# Patient Record
Sex: FEMALE | Race: WHITE
[De-identification: names, ages, dates, MRNs, and addresses within clinical notes are randomized per-mention and may not be internally consistent; named-entity substitution may affect disease eponyms.]

---

## 2017-02-07 ENCOUNTER — HOSPITAL ENCOUNTER (EMERGENCY)
Dept: HOSPITAL 25 - UCCORT | Age: 52
Discharge: HOME | End: 2017-02-07
Payer: COMMERCIAL

## 2017-02-07 VITALS — SYSTOLIC BLOOD PRESSURE: 103 MMHG | DIASTOLIC BLOOD PRESSURE: 71 MMHG

## 2017-02-07 DIAGNOSIS — H10.9: Primary | ICD-10-CM

## 2017-02-07 DIAGNOSIS — Z88.5: ICD-10-CM

## 2017-02-07 DIAGNOSIS — J06.9: ICD-10-CM

## 2017-02-07 DIAGNOSIS — M32.9: ICD-10-CM

## 2017-02-07 DIAGNOSIS — F17.210: ICD-10-CM

## 2017-02-07 DIAGNOSIS — Z88.1: ICD-10-CM

## 2017-02-07 PROCEDURE — 99212 OFFICE O/P EST SF 10 MIN: CPT

## 2017-02-07 PROCEDURE — G0463 HOSPITAL OUTPT CLINIC VISIT: HCPCS

## 2017-02-07 NOTE — UC
Throat Pain/Nasal Genaro HPI





- HPI Summary


HPI Summary: 





complaint of right eye redness and itching which started last night - some 

purulent discharge and crusting on eyelashes this morning


 used normal saline drops without relief


nasal congestion that returned 3 days ago


mild sore throat


post nasal drip


non productive cough


 dx with sinusitis and finished a course of augmentin approx 6 days ago


still taking prednisone for lupus flareup


taking tylenol, affrin nasal spray OTC cold and sinus





- History of Current Complaint


Chief Complaint: UCGeneralIllness


Stated Complaint: SINUS/PINK EYE


Time Seen by Provider: 02/07/17 17:01


Hx Obtained From: Patient





- Allergies/Home Medications


Allergies/Adverse Reactions: 


 Allergies











Allergy/AdvReac Type Severity Reaction Status Date / Time


 


Ciprofloxacin [From Cipro] Allergy Intermediate itchy rash Verified 02/07/17 16:

42


 


Levofloxacin [From Levaquin] Allergy Intermediate itchy rash Verified 02/07/17 

16:42


 


Morphine and Related Allergy Intermediate itchy rash Verified 02/07/17 16:42











Home Medications: 


 Home Medications





Folic Acid TAB* [Folvite TAB*] 1 mg PO DAILY 02/07/17 [History Confirmed 02/07/ 17]


Hydroxychloroquine TAB* [Plaquenil TAB*] 200 mg PO BID 02/07/17 [History 

Confirmed 02/07/17]


Methotrexate* 12.5 mg .SEE ORDER WEEKLY 02/07/17 [History Confirmed 02/07/17]











PMH/Surg Hx/FS Hx/Imm Hx


Previously Healthy: No - lupus flareup


Endocrine History Of: Reports: Thyroid Disease


Cardiovascular History Of: Reports: Cardiac Disorders - SVT





- Surgical History


Surgical History: Yes


Surgery Procedure, Year, and Place: HYSTERECTOMY, GALL BLADDER REMOVAL, 

TONSILLECTOMY





- Family History


Known Family History: 


   Negative: Cardiac Disease, Hypertension, Diabetes





- Social History


Occupation: Employed Full-time


Lives: With Family


Alcohol Use: Occasionally


Substance Use Type: None


Smoking Status (MU): Current Some Day Smoker


Type: Cigarettes


Length of Time of Smoking/Using Tobacco: 30 YRS


Cessation Counseling: Patient Advised to Stop





Review of Systems


Constitutional: Negative


ENT: Sore Throat, Nasal Discharge


Respiratory: Cough


Cardiovascular: Negative


Gastrointestinal: Negative


Genitourinary: Negative


Motor: Negative


Neurovascular: Negative


Musculoskeletal: Negative


Neurological: Negative


Psychological: Negative


All Other Systems Reviewed And Are Negative: Yes





Physical Exam


Triage Information Reviewed: Yes


Appearance: No Pain Distress, Well-Nourished


Vital Signs: 


 Initial Vital Signs











Temp  98.2 F   02/07/17 16:37


 


Pulse  85   02/07/17 16:37


 


Resp  16   02/07/17 16:37


 


BP  103/71   02/07/17 16:37


 


Pulse Ox  98   02/07/17 16:37











Vital Signs Reviewed: Yes


Eyes: Positive: Conjunctiva Inflamed - right eye, Discharge


ENT: Positive: Pharynx normal, Nasal congestion, Nasal drainage - clear, Other: 

- no sinus tednerness.  Negative: TMs normal, Tonsillar swelling, Tonsillar 

exudate


Neck: Positive: No Lymphadenopathy


Respiratory: Positive: Lungs clear, Normal breath sounds, No respiratory 

distress


Cardiovascular: Positive: RRR, No Murmur, Pulses Normal


Abdomen Description: Positive: Nontender, Soft


Bowel Sounds: Positive: Present


Musculoskeletal: Positive: No Edema


Neurological: Positive: Alert


Psychological Exam: Normal


Skin Exam: Normal





Throat Pain/Nasal Course/Dx





- Differential Dx/Diagnosis


Differential Diagnosis/HQI/PQRI: Pharyngitis, Sinusitis, URI


Provider Diagnoses: conjuncivitis, URI





Discharge





- Discharge Plan


Condition: Stable


Disposition: HOME


Prescriptions: 


Tobramycin (Ophth) [Tobrex] 0.3 % OP Q4HR #1 btl


Patient Education Materials:  Conjunctivitis (ED), Upper Respiratory Infection (

ED)


Referrals: 


Arielle Og MD [Primary Care Provider] - 


Additional Instructions: 


Start antibiotic drops as directed


Increase fluids and rest


Take acetaminophen or ibuprofen for fever or pain





Please review your discharge instructions. 


 If your symptoms do not improve please call your primary care provider or 

return to urgent care

## 2020-02-23 ENCOUNTER — HOSPITAL ENCOUNTER (EMERGENCY)
Dept: HOSPITAL 25 - UCCORT | Age: 55
Discharge: HOME | End: 2020-02-23
Payer: COMMERCIAL

## 2020-02-23 VITALS — DIASTOLIC BLOOD PRESSURE: 42 MMHG | SYSTOLIC BLOOD PRESSURE: 108 MMHG

## 2020-02-23 DIAGNOSIS — Z88.1: ICD-10-CM

## 2020-02-23 DIAGNOSIS — E07.9: ICD-10-CM

## 2020-02-23 DIAGNOSIS — F17.210: ICD-10-CM

## 2020-02-23 DIAGNOSIS — B02.9: Primary | ICD-10-CM

## 2020-02-23 DIAGNOSIS — Z79.890: ICD-10-CM

## 2020-02-23 DIAGNOSIS — Z88.5: ICD-10-CM

## 2020-02-23 PROCEDURE — G0463 HOSPITAL OUTPT CLINIC VISIT: HCPCS

## 2020-02-23 PROCEDURE — 99202 OFFICE O/P NEW SF 15 MIN: CPT

## 2020-02-23 NOTE — XMS REPORT
Summary of Care

 Created on:2020



Patient:Marina Bustos

Sex:Female

:1965

External Reference #:[id number]





Demographics







 Address  [address] [place] [address]

 

 Mobile Phone  [phone]

 

 Home Phone  [phone]

 

 Email Address  [email or web address]

 

 Preferred Language  English

 

 Marital Status  Not  or 

 

 Baptism Affiliation  Unknown

 

 Race  White

 

 Ethnic Group  Not  or 









Author







 Organization  [facility]

 

 Address  [address] [place] [address]









Support







 Name  Relationship  Address  Phone

 

 Larry Bustos  Spouse  [address]  [phone]



     [place] [address]  

 

 Javier Bryson  Son  Unavailable  [phone]









Care Team Providers







 Name  Role  Phone

 

 Denisha Alexsandra A NP  Primary Care Provider  [phone]









Reason for Referral

Diagnostic Radiology (Routine)





 Status  Reason  Specialty  Diagnoses /  Referred By  Referred To



       Procedures  Contact  Contact

 

 Authorized    Radiology  Diagnoses



Cervical radiculopathy



Neck pain



Myofascial muscle pain  Alex Beck,  



       



Procedures



MR Cervical Spine without Contrast  [facility]



  



         [address]



  



         [address]



  



         [place]  



         [address]



  



         Phone:  



         [phone]



  



         Fax: [phone]



  



         Email:  



         [email or web address]  



         u  









Reason for Visit

Diagnostic Radiology (Routine)





 Status  Reason  Specialty  Diagnoses /  Referred By  Referred To



       Procedures  Contact  Contact

 

 Authorized    Radiology  Diagnoses



Cervical radiculopathy



Neck pain



Myofascial muscle pain  Alex Beck,  



       



Procedures



MR Cervical Spine without Contrast  [facility]



  



         [address]



  



         [address]



  



         [place]  



         [address]



  



         Phone:  



         [phone]



  



         Fax: [phone]



  



         Email:  



         [email or web address]  



         u  









Encounter Details







 Date  Type  Department  Care Team  Description

 

 2020  Hospital Encounter  MRI [facility]



    Cervical radiculopathy;



     [address]



    Neck pain;



     Cornelio [facility]



    Myofascial muscle pain



     [place]    



     [address] [phone]    







Allergies







 Active Allergy  Reactions  Severity  Noted Date  Comments

 

 Ciprofloxacin  Rash  Low  2014  

 

 Duloxetine  Other (See Comments)    2017  Chest pain

 

 Venlafaxine  Other (See Comments)    2017  Chest Pain

 

 Levofloxacin  Rash  Low  2014  

 

 Atorvastatin  Rash  Low  2014  

 

 Morphine And Related  Rash  Low  2014  



documented as of this encounter (statuses as of 2020)



Medications







 Medication  Sig  Dispensed  Refills  Start Date  End Date  Status

 

 TRICOR 145 MG tablet  Take 145 mg by    0  2014    Active



   mouth daily.          

 

 SYNTHROID 50 MCG tablet  Take 50 mcg by    0  2014    Active



   mouth daily.          

 

 Ascorbic Acid (VITAMIN C  Take 500 mg by    0      Active



 WITH ANGELITO HIPS) 500 MG  mouth daily.          



 tablet            

 

 Diclofenac Sodium 1.5 %  Apply  topically    0  2015    Active



 SOLN  as needed.          

 

 ondansetron (ZOFRAN) 4  Take 4 mg by    0  2015    Active



 MG tablet  mouth as needed.          

 

 cyclobenzaprine  Take 2 tablets  30 tablet  1  2015    Active



 (FLEXERIL) 5 MG tablet  by mouth Three          



   times daily as          



   needed.          

 

 magnesium oxide (MAG-OX)  Take 400 mg by    0      Active



 400 MG tablet  mouth daily.          

 

 B Complex Vitamins  Take 1 tablet by    0  2014    Active



 (VITAMIN B COMPLEX IJ)  mouth daily.          

 

 Cholecalciferol (VITAMIN  Take 2,000 Units    0  2012    Active



 D3) 2000 UNITS capsule  by mouth daily.          

 

 Misc. Devices (DURABLE  Use as directed.  2 each  3  2015    Active



 MEDICAL EQUIPMENT SEE  20-30 mm Hg          



 SIG) MISC  compression          



   garments; knee          



   high; 2 pair;          



   Jobst or          



   equivalent          

 

 omeprazole (PRILOSEC) 40  Take 40 mg by    0      Active



 MG capsule  mouth daily.          

 

 loratadine (CLARITIN) 10  Take 10 mg by    0      Active



 MG tablet  mouth daily          

 

 simvastatin (ZOCOR) 10      0  2018    Active



 MG tablet            

 

 maalox/lidocaine/diphenh  Swish and spit  900 mL  1  2018    Active



 ydrAMINE (RADIATION  10 mLs every 4          



 MIXTURE) 1:1:1 oral  (four) hours as          



 suspension  needed Pharmacy          



   compound:          



   Maalox,          



   lidocaine          



   viscous 2 %,          



   Benadryl 12.5          



   mg/5 mL          

 

 metoprolol (TOPROL-XL)  Take 50 mg by    0      Active



 50 MG 24 hr tablet  mouth daily          

 

 HYDROcodone-acetaminophe  Take 1 tablet by  12 tablet  0  2018    Active



 n (LORTAB) 5-325 MG per  mouth every 6          



 tablet  (six) hours as          



   needed , Max          



   Daily Dose: 4          



   tablets          

 

 budesonide (PULMICORT)  1 respule in  60 mL  12  10/18/2018    Active



 0.25 MG/2ML nebulizer  nasal saline was          



 solution  daily          

 

 pantoprazole (PROTONIX)  Take 1 tablet by  60 tablet  5  2018    Active



 40 MG tabletIndications:  mouth Two times          



 Gastroesophageal reflux  daily before          



 disease, esophagitis  meals          



 presence not specified            

 

 Misc. Devices (DURABLE  Use as directed.  2 each  3  2018    Active



 MEDICAL EQUIPMENT SEE  20-30 mm Hg          



 SIG) MISCIndications:  Compression          



 Acute deep vein  Garments, Knee          



 thrombosis (DVT) of  High, Jobst or          



 femoral vein of left  Equivalent, two          



 lower extremity,  pair          



 Post-phlebitic syndrome            

 

 fluticasone (FLONASE) 50  2 sprays by  16 g  11  2019    Active



 MCG/ACT nasal spray  Nasal route          



   daily          

 

 cetirizine (ZYRTEC) 10  Take 1 tablet by  30 tablet  11  2019
  Active



 MG tablet  mouth daily        0  

 

 metoprolol tartrate  TAKE 1 2 TABLET    2  10/29/2018    Active



 (LOPRESSOR) 25 MG tablet  BY MOUTH EVERY          



   NIGHT          

 

 FOLBEE 2.5-25-1 MG TABS  Take 1 tablet by  30 each  3  2019    Active



   mouth daily          

 

 enoxaparin sodium  Inject 0.8 mLs  14.98 mL  1  2019    Active



 (LOVENOX) 120 MG/0.8ML  into the skin          



 SOLN injection  daily          

 

 meloxicam (MOBIC) 7.5 MG  Take 1 tablet by  90 tablet  3  2019  Active



 tabletIndications: Acute  mouth daily        0  



 deep vein thrombosis            



 (DVT) of femoral vein of            



 left lower extremity,            



 Post-phlebitic syndrome            

 

 gabapentin (NEURONTIN)  Take 1.5 tablets  135 tablet  11  2019  Active



 600 MG tablet  by mouth Three        0  



   times daily          

 

 mycophenolate (CELLCEPT)  Take 1 tablet by  90 tablet  11  10/16/2019  10/14/
202  Active



 500 MG tablet  mouth Three        0  



   times daily with          



   meals          

 

 hydroxychloroquine  Take 1 tablet by  180 tablet  3  10/16/2019    Active



 (PLAQUENIL) 200 MG  mouth Two Times          



 tablet  Daily          

 

 PEG 3350-KCl-Na  The day prior to  4000 mL  0  2019    Active



 Bicarb-NaCl 420 GM Oral  procedure          



 Solution Reconstituted  between 2:30-5          



 (NULYTELY)Indications:  begin drinking          



 Encounter for screening  an 8 ounce glass          



 colonoscopy  every 10-15 min          



   until solution          



   is finished          

 

 Apixaban 5 MG Oral  Take 1 tablet by  60 tablet  5  2020    Active



 Tablet  mouth Two Times          



 (ELIQUIS)Indications:  Daily          



 Antiphospholipid            



 syndrome            

 

 tiZANidine HCl 4 MG Oral  Take 0.5 tablets  30 tablet  2  2020  02/15/
202  Active



 Tablet (ZANAFLEX)  by mouth nightly        0  



documented as of this encounter (statuses as of 2020)



Active Problems







 Patient Care Coordination Note

 

 [facility] Pharmacists manage anticoagulation and ALL anticoagulant 
prescriptions ([phone])



 FOR CURRENT ANTICOAGULANT DOSAGE PLEASE REFER TO MOST RECENT ANTICOAG-VISIT OR 
PHARMACIST TELEPHONE NOTE.









 Problem  Noted Date

 

 Chronic anticoagulation  2019

 

 Antiphospholipid syndrome  2019

 

 Anticoagulation goal of INR 2.5-3  2019









 Overview: 



Formatting of this note might be different from the original.



 DO NOT RESOLVE THIS PROBLEM. USED FOR ANTICOAGULATION MONITORING



 



 ANTICOAGULANT: warfarin



 ANTICOAGULATION INDICATION: recurrent dvt X3 and aplas hyperhomosysteinemia (
most recent  dvt left femoral proximal)



 KNOWN CONFOUNDING FACTORS:



 EXISTING DIETARY INTERACTIONS:



 EXISTING MEDICATION INTERACTIONS: levothyroxine



 DESIRED INR RANGE: 2.5-3 (garcia)



 INR LOCATION: [facility] lab



 DATE STARTED:



 INITIAL ANTICOAG NOTE:



 INTENDED DURATION: chronic



 COMPLETION (anticipated):



 BRIDGING:



 PREGNANCY RISK (Y/N): n



 (If Yes, specify contraception method):



 HYPERCOAG WORKUP?:



 DOAC CANDIDATE?: no



 



 Results for MARINA BUSTOS DOM (MRN [id number]) as of 3/15/2019 12:48



  Ref. Range 2015 10:30 2015 15:42 2015 10:41



 Cardiolipin IgA Ab Latest Ref Range: 0 - 11 APL  2



 Cardiolipin IgG Ab Latest Ref Range: 0 - 14 GPL  1



 Cardiolipin IgM Ab Latest Ref Range: 0 - 12 MPL  5



 B2 Glycoprotein IgG Latest Ref Range: <=20 SGU  <9



 B2 Glycoprotein IgM Latest Ref Range: <=20 SMU  <9



 B2 Glygoprotein IgA Latest Ref Range: <=20 ANU  <9



 dRVV Ratio Latest Ref Range: <1.20 Ratio   1.42 (H)



 Hex Phase Phospho Neut Latest Ref Range: <10.0 sec 5.2  5.1



 PLATELET NEUTRALIZATION Latest Ref Range: <1.0 sec   0.0









 Chronic maxillary sinusitis  2018

 

 Chronic ethmoidal sinusitis  2018

 

 Nasal turbinate hypertrophy  2018

 

 PONV (postoperative nausea and vomiting)  2018

 

 GERD without esophagitis  2018

 

 Sudden onset of severe abdominal pain  2016

 

 Neck pain  2016

 

 Rash  2015

 

 Venous insufficiency  2015

 

 Post-phlebitic syndrome  2015

 

 Lupus (systemic lupus erythematosus)  08/10/2015

 

 DVT (deep venous thrombosis)  08/10/2015

 

 Antiphospholipid antibody syndrome  08/10/2015

 

 Myofascial muscle pain  2015

 

 Radiculopathy  2014

 

 CTS (carpal tunnel syndrome)  2014

 

 Cervical stenosis of spine  2014



documented as of this encounter (statuses as of 2020)



Immunizations







 Name  Administration Dates  Next Due

 

 Influenza Quad IM with Pres (0.5 mL dose)  10/14/2016  

 

 Pneumococcal Conjugate PCV13  2016  



documented as of this encounter



Social History







 Tobacco Use  Types  Packs/Day  Years Used  Date

 

 Light Tobacco Smoker        









 Smokeless Tobacco: Never Used      









 Comments: one every few years









 Alcohol Use  Drinks/Week  oz/Week  Comments

 

 Yes  3 Cans of beer  3.0  









 Sex Assigned at Birth  Date Recorded

 

 Not on file  









 Job Start Date  Occupation  Industry

 

 Not on file  Not on file  Not on file









 Travel History  Travel Start  Travel End









 No recent travel history available.



documented as of this encounter



Last Filed Vital Signs

Not on filedocumented in this encounter



Plan of Treatment







 Date  Type  Specialty  Care Team  Description

 

 2020  Procedure visit  Pain Medicine  DOM Steiner MD



  



       [address]



  



       [place] [address]



  



       [phone] (Fax)  

 

 2020  Appointment  Radiology    

 

 2020  Office Visit  Vascular Surgery  José Miguel Fisher MD



  



       [address]



  



       [address]



  



       [place] [address]



  



       [phone] (Fax)  

 

 2020  Office Visit  Rheumatology  Juanpablo Jerez MD



  



       [address] [facility]



  



       2nd Floor [address]



  



       [place] [address]



  



       [phone] (Fax)  









 Name  Type  Priority  Associated Diagnoses  Date/Time

 

 MR Cervical Spine  Imaging  Routine  Cervical radiculopathy



  2020  5:13 PM



 without Contrast      Neck pain



  EST



       Myofascial muscle pain  









 Name  Type  Priority  Associated Diagnoses  Order Schedule

 

 MR Cervical Spine  Imaging  Routine  Cervical radiculopathy



  As Needed for 1



 without Contrast      Neck pain



  Occurrences starting



       Myofascial muscle pain  2020 until



         2020









 Health Maintenance  Due Date  Last Done  Comments

 

 MMR Vaccines (1 of 1 -  [birth year]1967    



 Standard series)      

 

 Varicella Vaccines (1 of 2  10/06/1967    



 - 2-dose childhood series)      

 

 DTaP,Tdap,and Td Vaccines  10/06/1973    



 (1 - Tdap)      

 

 HIV Screening  10/06/1979    

 

 Cervical Cancer Screening 5  10/06/1987    



 years      

 

 Pneumococcal Vaccine:  2017  



 Pediatrics (0 to 5 Years)      



 and At-Risk Patients (6 to      



 64 Years) (1 of 1 - PPSV23)      

 

 Influenza Vaccine  10/01/2019  10/14/2016  

 

 Breast Cancer Screening 2  2021, 2018,  



 years    2018, Additional  



     history exists  

 

 Colon Cancer Screening 5  2025, 2016  



 yrs      

 

 Pneumococcal Vaccine: 65+  10/06/2031  2016  



 Years (2 of 2 - PPSV23)      

 

 HIB Vaccines  Aged Out    No longer eligible



       based on patient's age



       to complete this topic

 

 Hepatitis A Vaccines  Aged Out    No longer eligible



       based on patient's age



       to complete this topic

 

 Hepatitis B Vaccines  Aged Out    No longer eligible



       based on patient's age



       to complete this topic

 

 IPV Vaccines  Aged Out    No longer eligible



       based on patient's age



       to complete this topic



documented as of this encounter



Implants







 Implanted  Type  Area    Device  Shelf  Model /



         Identifier  Expiration Date  Serial /



             Lot

 

 [employer] Eviva [id number]. - [id number]    Right:  HOLOGIC    2018  [id number] /



 Implanted: Qty: 1 on 2018 by Bladimir Arita MD at [facility]         /



             [id number]



documented as of this encounter



Results

Not on filedocumented in this encounter



Visit Diagnoses







 Diagnosis

 

 Cervical radiculopathy







 Brachial neuritis or radiculitis nos

 

 Neck pain







 Cervicalgia

 

 Myofascial muscle pain







 Mylagia and myositis, unspecified



documented in this encounter

## 2020-02-23 NOTE — XMS REPORT
Summary of Care

 Created on:2020



Patient:Melina Bustos

Sex:Female

:1965

External Reference #:[id number]





Demographics







 Address  [address] [place] [address]

 

 Mobile Phone  [phone]

 

 Work Phone  [phone]

 

 Home Phone  [phone]

 

 Email Address  [email or web address]

 

 Preferred Language  English

 

 Marital Status  Not  or 

 

 Quaker Affiliation  Unknown

 

 Race  White

 

 Ethnic Group  Not  or 









Author







 Organization  [facility]

 

 Address  [address] [place] [address]









Support







 Name  Relationship  Address  Phone

 

 Larry Bustos  Spouse  [address]  [phone]



     [place] [address]  

 

 Javier Bryson  Son  Unavailable  [phone]









Care Team Providers







 Name  Role  Phone

 

 Alexsandra Denny JJ NP  Primary Care Provider  [phone]









Encounter Details







 Date  Type  Department  Care Team  Description

 

 2020  Hospital Encounter  Diagnostic Radiology    Right shoulder pain,



     [id number]



    unspecified chronicity



     [address] [place], [place]    



     [address] [phone]    







Allergies







 Active Allergy  Reactions  Severity  Noted Date  Comments

 

 Ciprofloxacin  Rash  Low  2014  

 

 Duloxetine  Other (See Comments)    2017  Chest pain

 

 Venlafaxine  Other (See Comments)    2017  Chest Pain

 

 Levofloxacin  Rash  Low  2014  

 

 Atorvastatin  Rash  Low  2014  

 

 Morphine And Related  Rash  Low  2014  



documented as of this encounter (statuses as of 2020)



Medications







 Medication  Sig  Dispensed  Refills  Start Date  End Date  Status

 

 TRICOR 145 MG tablet  Take 145 mg by    0  2014    Active



   mouth daily.          

 

 SYNTHROID 50 MCG tablet  Take 50 mcg by    0  2014    Active



   mouth daily.          

 

 Ascorbic Acid (VITAMIN C  Take 500 mg by    0      Active



 WITH ANGELITO HIPS) 500 MG  mouth daily.          



 tablet            

 

 Diclofenac Sodium 1.5 %  Apply  topically    0  2015    Active



 SOLN  as needed.          

 

 ondansetron (ZOFRAN) 4  Take 4 mg by    0  2015    Active



 MG tablet  mouth as needed.          

 

 cyclobenzaprine  Take 2 tablets  30 tablet  1  2015    Active



 (FLEXERIL) 5 MG tablet  by mouth Three          



   times daily as          



   needed.          

 

 magnesium oxide (MAG-OX)  Take 400 mg by    0      Active



 400 MG tablet  mouth daily.          

 

 B Complex Vitamins  Take 1 tablet by    0  2014    Active



 (VITAMIN B COMPLEX IJ)  mouth daily.          

 

 Cholecalciferol (VITAMIN  Take 2,000 Units    0  2012    Active



 D3) 2000 UNITS capsule  by mouth daily.          

 

 Misc. Devices (DURABLE  Use as directed.  2 each  3  2015    Active



 MEDICAL EQUIPMENT SEE  20-30 mm Hg          



 SIG) MISC  compression          



   garments; knee          



   high; 2 pair;          



   Jobst or          



   equivalent          

 

 omeprazole (PRILOSEC) 40  Take 40 mg by    0      Active



 MG capsule  mouth daily.          

 

 loratadine (CLARITIN) 10  Take 10 mg by    0      Active



 MG tablet  mouth daily          

 

 simvastatin (ZOCOR) 10      0  2018    Active



 MG tablet            

 

 maalox/lidocaine/diphenh  Swish and spit  900 mL  1  2018    Active



 ydrAMINE (RADIATION  10 mLs every 4          



 MIXTURE) 1:1:1 oral  (four) hours as          



 suspension  needed Pharmacy          



   compound:          



   Maalox,          



   lidocaine          



   viscous 2 %,          



   Benadryl 12.5          



   mg/5 mL          

 

 metoprolol (TOPROL-XL)  Take 50 mg by    0      Active



 50 MG 24 hr tablet  mouth daily          

 

 HYDROcodone-acetaminophe  Take 1 tablet by  12 tablet  0  2018    Active



 n (LORTAB) 5-325 MG per  mouth every 6          



 tablet  (six) hours as          



   needed , Max          



   Daily Dose: 4          



   tablets          

 

 budesonide (PULMICORT)  1 respule in  60 mL  12  10/18/2018    Active



 0.25 MG/2ML nebulizer  nasal saline was          



 solution  daily          

 

 pantoprazole (PROTONIX)  Take 1 tablet by  60 tablet  5  2018    Active



 40 MG tabletIndications:  mouth Two times          



 Gastroesophageal reflux  daily before          



 disease, esophagitis  meals          



 presence not specified            

 

 Misc. Devices (DURABLE  Use as directed.  2 each  3  2018    Active



 MEDICAL EQUIPMENT SEE  20-30 mm Hg          



 SIG) MISCIndications:  Compression          



 Acute deep vein  Garments, Knee          



 thrombosis (DVT) of  High, Jobst or          



 femoral vein of left  Equivalent, two          



 lower extremity,  pair          



 Post-phlebitic syndrome            

 

 fluticasone (FLONASE) 50  2 sprays by  16 g  11  2019    Active



 MCG/ACT nasal spray  Nasal route          



   daily          

 

 cetirizine (ZYRTEC) 10  Take 1 tablet by  30 tablet  11  2019
  Active



 MG tablet  mouth daily        0  

 

 metoprolol tartrate  TAKE 1 2 TABLET    2  10/29/2018    Active



 (LOPRESSOR) 25 MG tablet  BY MOUTH EVERY          



   NIGHT          

 

 FOLBEE 2.5-25-1 MG TABS  Take 1 tablet by  30 each  3  2019    Active



   mouth daily          

 

 enoxaparin sodium  Inject 0.8 mLs  14.98 mL  1  2019    Active



 (LOVENOX) 120 MG/0.8ML  into the skin          



 SOLN injection  daily          

 

 meloxicam (MOBIC) 7.5 MG  Take 1 tablet by  90 tablet  3  2019  Active



 tabletIndications: Acute  mouth daily        0  



 deep vein thrombosis            



 (DVT) of femoral vein of            



 left lower extremity,            



 Post-phlebitic syndrome            

 

 apixaban (ELIQUIS) 5 MG  Take 1 tablet by  60 tablet  5  2019    Active



 tabletIndications:  mouth Two Times          



 Antiphospholipid  Daily          



 syndrome            

 

 gabapentin (NEURONTIN)  Take 1.5 tablets  135 tablet  11  2019  Active



 600 MG tablet  by mouth Three        0  



   times daily          

 

 mycophenolate (CELLCEPT)  Take 1 tablet by  90 tablet  11  10/16/2019  10/14/
202  Active



 500 MG tablet  mouth Three        0  



   times daily with          



   meals          

 

 hydroxychloroquine  Take 1 tablet by  180 tablet  3  10/16/2019    Active



 (PLAQUENIL) 200 MG  mouth Two Times          



 tablet  Daily          

 

 PEG 3350-KCl-Na  The day prior to  4000 mL  0  2019    Active



 Bicarb-NaCl 420 GM Oral  procedure          



 Solution Reconstituted  between 2:30-5          



 (NULYTELY)Indications:  begin drinking          



 Encounter for screening  an 8 ounce glass          



 colonoscopy  every 10-15 min          



   until solution          



   is finished          



documented as of this encounter (statuses as of 2020)



Active Problems







 Patient Care Coordination Note

 

 [facility] Pharmacists manage anticoagulation and ALL anticoagulant 
prescriptions ([phone])



 FOR CURRENT ANTICOAGULANT DOSAGE PLEASE REFER TO MOST RECENT ANTICOAG-VISIT OR 
PHARMACIST TELEPHONE NOTE.









 Problem  Noted Date

 

 Chronic anticoagulation  2019

 

 Antiphospholipid syndrome  2019

 

 Anticoagulation goal of INR 2.5-3  2019









 Overview: 



Formatting of this note might be different from the original.



 DO NOT RESOLVE THIS PROBLEM. USED FOR ANTICOAGULATION MONITORING



 



 ANTICOAGULANT: warfarin



 ANTICOAGULATION INDICATION: recurrent dvt X3 and aplas hyperhomosysteinemia (
most recent  dvt left femoral proximal)



 KNOWN CONFOUNDING FACTORS:



 EXISTING DIETARY INTERACTIONS:



 EXISTING MEDICATION INTERACTIONS: levothyroxine



 DESIRED INR RANGE: 2.5-3 (garcia)



 INR LOCATION: [facility] lab



 DATE STARTED:



 INITIAL ANTICOAG NOTE:



 INTENDED DURATION: chronic



 COMPLETION (anticipated):



 BRIDGING:



 PREGNANCY RISK (Y/N): n



 (If Yes, specify contraception method):



 HYPERCOAG WORKUP?:



 DOAC CANDIDATE?: no



 



 Results for MELINA BUSTOS (MRN [id number]) as of 3/15/2019 12:48



  Ref. Range 2015 10:30 2015 15:42 2015 10:41



 Cardiolipin IgA Ab Latest Ref Range: 0 - 11 APL  2



 Cardiolipin IgG Ab Latest Ref Range: 0 - 14 GPL  1



 Cardiolipin IgM Ab Latest Ref Range: 0 - 12 MPL  5



 B2 Glycoprotein IgG Latest Ref Range: <=20 SGU  <9



 B2 Glycoprotein IgM Latest Ref Range: <=20 SMU  <9



 B2 Glygoprotein IgA Latest Ref Range: <=20 ANU  <9



 dRVV Ratio Latest Ref Range: <1.20 Ratio   1.42 (H)



 Hex Phase Phospho Neut Latest Ref Range: <10.0 sec 5.2  5.1



 PLATELET NEUTRALIZATION Latest Ref Range: <1.0 sec   0.0









 Chronic maxillary sinusitis  2018

 

 Chronic ethmoidal sinusitis  2018

 

 Nasal turbinate hypertrophy  2018

 

 PONV (postoperative nausea and vomiting)  2018

 

 GERD without esophagitis  2018

 

 Sudden onset of severe abdominal pain  2016

 

 Neck pain  2016

 

 Rash  2015

 

 Venous insufficiency  2015

 

 Post-phlebitic syndrome  2015

 

 Lupus (systemic lupus erythematosus)  08/10/2015

 

 DVT (deep venous thrombosis)  08/10/2015

 

 Antiphospholipid antibody syndrome  08/10/2015

 

 Myofascial muscle pain  2015

 

 Radiculopathy  2014

 

 CTS (carpal tunnel syndrome)  2014

 

 Cervical stenosis of spine  2014



documented as of this encounter (statuses as of 2020)



Immunizations







 Name  Administration Dates  Next Due

 

 Influenza Quad IM with Pres (0.5 mL dose)  10/14/2016  

 

 Pneumococcal Conjugate PCV13  2016  



documented as of this encounter



Social History







 Tobacco Use  Types  Packs/Day  Years Used  Date

 

 Light Tobacco Smoker        









 Smokeless Tobacco: Never Used      









 Comments: one every few years









 Alcohol Use  Drinks/Week  oz/Week  Comments

 

 Yes  3 Cans of beer  3.0  









 Sex Assigned at Birth  Date Recorded

 

 Not on file  









 Job Start Date  Occupation  Industry

 

 Not on file  Not on file  Not on file









 Travel History  Travel Start  Travel End









 No recent travel history available.



documented as of this encounter



Last Filed Vital Signs

Not on filedocumented in this encounter



Plan of Treatment







 Date  Type  Specialty  Care Team  Description

 

 2020  Office Visit  Rheumatology  Juanpablo Jerez MD



  



       [address] [facility] [facility]



  



       2nd Floor [address]



  



       [place] [address]



  



       [phone] (Fax)  

 

 2020  Office Visit  Pain Medicine    

 

 2020  Procedure visit  Gastroenterology  Mily Camarillo MD



  



       [address]



  



       [address]



  



       [place] [address]



  



       [phone] (Fax)  

 

 2020  Office Visit  Vascular Surgery  José Miguel Fisher MD



  



       [address]



  



       [address]



  



       [place] [address]



  



       [phone] (Fax)  









 Health Maintenance  Due Date  Last Done  Comments

 

 MMR Vaccines (1 of 1 -  [birth year]1967    



 Standard series)      

 

 Varicella Vaccines (1 of 2  10/06/1967    



 - 2-dose childhood series)      

 

 DTaP,Tdap,and Td Vaccines  10/06/1973    



 (1 - Tdap)      

 

 HIV Screening  10/06/1979    

 

 Cervical Cancer Screening 5  10/06/1987    



 years      

 

 Pneumococcal Vaccine:  2017  



 Pediatrics (0 to 5 Years)      



 and At-Risk Patients (6 to      



 64 Years) (1 of 1 - PPSV23)      

 

 Influenza Vaccine  10/01/2019  10/14/2016  

 

 Breast Cancer Screening 2  2021, 2018,  



 years    2018, Additional  



     history exists  

 

 Colon Cancer Screening 10  2026  



 yrs      

 

 Pneumococcal Vaccine: 65+  10/06/2031  2016  



 Years (2 of 2 - PPSV23)      

 

 HIB Vaccines  Aged Out    No longer eligible



       based on patient's age



       to complete this topic

 

 Hepatitis A Vaccines  Aged Out    No longer eligible



       based on patient's age



       to complete this topic

 

 Hepatitis B Vaccines  Aged Out    No longer eligible



       based on patient's age



       to complete this topic

 

 IPV Vaccines  Aged Out    No longer eligible



       based on patient's age



       to complete this topic



documented as of this encounter



Implants







 Implanted  Type  Area    Device  Shelf  Model /



         Identifier  Expiration Date  Serial /



             Lot

 

 [employer] Charbel [id number]. - [id number]    Right:  HOLOGIC    2018  [id number] /



 Implanted: Qty: 1 on 2018 by Bladimir Arita MD at [facility]         /



             [id number]



documented as of this encounter



Procedures







 Procedure Name  Priority  Date/Time  Associated Diagnosis  Comments

 

 XR SHOULDER  Routine  2020 12:18 PM  Right shoulder pain,  Results for 
this



 COMPLETE 23374    EST  unspecified  procedure are in



       chronicity  the results



         section.



documented in this encounter



Results

XR Shoulder Complete Right (2020 12:18 PM EST)





 Specimen

 

 









 Impressions  Performed At

 

 IMPRESSION:



  [facility]



  



  



 No acute fracture or dislocation. 



  



 Mild right AC joint degenerative changes.  









 Narrative  Performed At

 

 This result has an attachment that is not available.



INDICATION: Right shoulder pain.  [facility]



   



 TECHNIQUE: Multiple views of the right shoulder were obtained.  



   



 COMPARISON: None.  



   



 FINDINGS: No acute fracture or dislocation. Bony alignment appears anatomic. 
Mild degenerative changes are seen within the right AC joint. The periarticular 
soft tissues appear unremarkable.  



   









 Procedure Note

 

 Interface, Received Via [employer] System - 2020  1:55 PM EST



INDICATION: Right shoulder pain.



 



 TECHNIQUE: Multiple views of the right shoulder were obtained.



 



 COMPARISON: None.



 



 FINDINGS: No acute fracture or dislocation. Bony alignment appears anatomic. 
Mild degenerative changes are seen within the right AC joint. The periarticular 
soft tissues appear unremarkable.



 



 



 IMPRESSION:



 



 No acute fracture or dislocation.



 Mild right AC joint degenerative changes.









 Performing Organization  Address  City/State/Zipcode  Phone Number

 

 [facility]  [address] [place] [address]  



documented in this encounter



Visit Diagnoses







 Diagnosis

 

 Right shoulder pain, unspecified chronicity



documented in this encounter

## 2020-02-23 NOTE — UC
Skin Complaint HPI





- HPI Summary


HPI Summary: 





54-year-old female who is experiencing a burning stinging rash to her mid back 

which started today.





- History of Current Complaint


Chief Complaint: UCSkin


Time Seen by Provider: 02/23/20 17:35


Stated Complaint: RASH


Hx Obtained From: Patient


Pregnant?: No


Onset/Duration: Gradual Onset


Skin Exposure Onset/Duration: Hours Ago


Timing: Constant


Onset Severity: Moderate


Current Severity: Moderate


Pain Intensity: 5


Location: Other


Character: Redness - Mid back, Raised, Painful


Aggravating Factor(s): Clothing


Alleviating Factor(s): Nothing


Associated Signs & Symptoms: Positive: Negative





- Allergy/Home Medications


Allergies/Adverse Reactions: 


 Allergies











Allergy/AdvReac Type Severity Reaction Status Date / Time


 


morphine Allergy Unknown itchy rash Verified 02/23/20 17:41


 


ciprofloxacin [From Cipro] Allergy  itchy rash Verified 02/23/20 17:41


 


levofloxacin [From Levaquin] Allergy  itchy rash Verified 02/23/20 17:41











Home Medications: 


 Home Medications





Fenofibrate [Tricor] 145 mg PO DAILY 12/30/14 [History Confirmed 02/23/20]


Gabapentin 900 mg PO TID 12/30/14 [History Confirmed 02/23/20]


Levothyroxine Sodium [Synthroid] 50 mcg PO DAILY 12/30/14 [History Confirmed 02/ 23/20]


Acetaminophen 1,000 mg PO DAILY 04/10/15 [History Confirmed 02/07/17]


Vitamin B Complex TAB* [Complex B-100] 1 tab PO DAILY 04/10/15 [History 

Confirmed 02/23/20]


Hydroxychloroquine TAB* [Plaquenil TAB*] 200 mg PO BID 02/07/17 [History 

Confirmed 02/07/17]


Apixaban* [Eliquis*] 5 mg PO BID 02/23/20 [History Confirmed 02/23/20]


Meloxicam 7.5 mg PO PRN 02/23/20 [History]


Mycophenolate Mofetil TAB(*) [Cellcept TAB(*)] 500 mg PO BID 02/23/20 [History 

Confirmed 02/23/20]


Omeprazole 40 mg PO DAILY 02/23/20 [History Confirmed 02/23/20]


Simvastatin TAB(NF) [Zocor(NF)] 10 mg PO DAILY 02/23/20 [History Confirmed 02/23 /20]


ValACYclovir (*) [Valtrex 1 GM(*)] 1 gm PO TID #20 tab 02/23/20 [Rx]











PMH/Surg Hx/FS Hx/Imm Hx


Previously Healthy: Yes


Endocrine History: Thyroid Disease


Cardiovascular History: Other - SVT





- Surgical History


Surgical History: Yes


Surgery Procedure, Year, and Place: HYSTERECTOMY, GALL BLADDER REMOVAL, 

TONSILLECTOMY





- Family History


Known Family History: 


   Negative: Cardiac Disease, Hypertension, Diabetes





- Social History


Alcohol Use: Occasionally


Substance Use Type: None


Smoking Status (MU): Current Some Day Smoker


Type: Cigarettes


Length of Time of Smoking/Using Tobacco: 30 YRS





Review of Systems


All Other Systems Reviewed And Are Negative: Yes


Skin: Positive: Rash - Rash mid back which started today and she describes as 

burning and stinging.


Is Patient Immunocompromised?: No





Physical Exam


Triage Information Reviewed: Yes


Appearance: Well-Appearing, No Pain Distress, Well-Nourished


Vital Signs: 


 Initial Vital Signs











Temp  97.8 F   02/23/20 17:47


 


Pulse  89   02/23/20 17:47


 


Resp  15   02/23/20 17:47


 


BP  108/42   02/23/20 17:47


 


Pulse Ox  98   02/23/20 17:47











Vital Signs Reviewed: Yes


Musculoskeletal Exam: Normal


Neurological Exam: Normal


Psychological Exam: Normal


Skin: Positive: Rashes - Patient has a herpetic rash to her mid back, which 

measures approximately 3.0 cm in diameter.





Course/Dx





- Course


Course Of Treatment: 





Patient is comfortable here.  She was given acyclovir 400 mg by mouth and she 

will continue valacyclovir 1 g 3 times a day 7 days.





- Diagnoses


Provider Diagnosis: 


 Shingles








Discharge ED





- Sign-Out/Discharge


Documenting (check all that apply): Patient Departure


All imaging exams completed and their final reports reviewed: No Studies





- Discharge Plan


Condition: Good


Disposition: HOME


Prescriptions: 


ValACYclovir (*) [Valtrex 1 GM(*)] 1 gm PO TID #20 tab


Patient Education Materials:  Shingles (ED)


Referrals: 


Arielle Og MD [Primary Care Provider] - 


Additional Instructions: 


Avoid scratching the area.  May take Tylenol every 4 hours and alternate with 

Aleve every 12 hours for pain.  Follow-up with your primary care provider if no 

improvement in 3 or 4 days.





- Billing Disposition and Condition


Condition: GOOD


Disposition: Home

## 2020-02-23 NOTE — XMS REPORT
Summary of Care

 Created on:2020



Patient:Melina Bustos

Sex:Female

:1965

External Reference #:[id number]





Demographics







 Address  [address] [place] [address]

 

 Mobile Phone  [phone]

 

 Home Phone  [phone]

 

 Email Address  [email or web address]

 

 Preferred Language  English

 

 Marital Status  Not  or 

 

 Mormonism Affiliation  Unknown

 

 Race  White

 

 Ethnic Group  Not  or 









Author







 Organization  [facility]

 

 Address  [address] [place] [address]









Support







 Name  Relationship  Address  Phone

 

 Larry Bustos  Spouse  [address]  [phone]



     [place] [address]  

 

 Javier Bryson  Son  Unavailable  [phone]









Care Team Providers







 Name  Role  Phone

 

 DenishaAlexsandra JJ NP  Primary Care Provider  [phone]









Encounter Details







 Date  Type  Department  Care Team  Description

 

 2020  Pro[facility] visit  [facility]



  Arrived



     at the [address]



  



     [facility]



  [address]



  



     [address] [place] [address]



  



     [address]



  [phone]



  



     [place] [address] [phone] (Fax)  



     [phone]    







Allergies







 Active Allergy  Reactions  Severity  Noted Date  Comments

 

 Ciprofloxacin  Rash  Low  2014  

 

 Duloxetine  Other (See Comments)    2017  Chest pain

 

 Venlafaxine  Other (See Comments)    2017  Chest Pain

 

 Levofloxacin  Rash  Low  2014  

 

 Atorvastatin  Rash  Low  2014  

 

 Morphine And Related  Rash  Low  2014  



documented as of this encounter (statuses as of 2020)



Medications







 Medication  Sig  Dispensed  Refills  Start Date  End Date  Status

 

 TRICOR 145 MG tablet  Take 145 mg by    0  2014    Active



   mouth daily.          

 

 SYNTHROID 50 MCG tablet  Take 50 mcg by    0  2014    Active



   mouth daily.          

 

 Ascorbic Acid (VITAMIN C  Take 500 mg by    0      Active



 WITH ANGELITO HIPS) 500 MG  mouth daily.          



 tablet            

 

 Diclofenac Sodium 1.5 %  Apply  topically    0  2015    Active



 SOLN  as needed.          

 

 ondansetron (ZOFRAN) 4  Take 4 mg by    0  2015    Active



 MG tablet  mouth as needed.          

 

 cyclobenzaprine  Take 2 tablets  30 tablet  1  2015    Active



 (FLEXERIL) 5 MG tablet  by mouth Three          



   times daily as          



   needed.          

 

 magnesium oxide (MAG-OX)  Take 400 mg by    0      Active



 400 MG tablet  mouth daily.          

 

 B Complex Vitamins  Take 1 tablet by    0  2014    Active



 (VITAMIN B COMPLEX IJ)  mouth daily.          

 

 Cholecalciferol (VITAMIN  Take 2,000 Units    0  2012    Active



 D3) 2000 UNITS capsule  by mouth daily.          

 

 Misc. Devices (DURABLE  Use as directed.  2 each  3  2015    Active



 MEDICAL EQUIPMENT SEE  20-30 mm Hg          



 SIG) MISC  compression          



   garments; knee          



   high; 2 pair;          



   Jobst or          



   equivalent          

 

 omeprazole (PRILOSEC) 40  Take 40 mg by    0      Active



 MG capsule  mouth daily.          

 

 loratadine (CLARITIN) 10  Take 10 mg by    0      Active



 MG tablet  mouth daily          

 

 simvastatin (ZOCOR) 10      0  2018    Active



 MG tablet            

 

 maalox/lidocaine/diphenh  Swish and spit  900 mL  1  2018    Active



 ydrAMINE (RADIATION  10 mLs every 4          



 MIXTURE) 1:1:1 oral  (four) hours as          



 suspension  needed Pharmacy          



   compound:          



   Maalox,          



   lidocaine          



   viscous 2 %,          



   Benadryl 12.5          



   mg/5 mL          

 

 metoprolol (TOPROL-XL)  Take 50 mg by    0      Active



 50 MG 24 hr tablet  mouth daily          

 

 HYDROcodone-acetaminophe  Take 1 tablet by  12 tablet  0  2018    Active



 n (LORTAB) 5-325 MG per  mouth every 6          



 tablet  (six) hours as          



   needed , Max          



   Daily Dose: 4          



   tablets          

 

 budesonide (PULMICORT)  1 respule in  60 mL  12  10/18/2018    Active



 0.25 MG/2ML nebulizer  nasal saline was          



 solution  daily          

 

 pantoprazole (PROTONIX)  Take 1 tablet by  60 tablet  5  2018    Active



 40 MG tabletIndications:  mouth Two times          



 Gastroesophageal reflux  daily before          



 disease, esophagitis  meals          



 presence not specified            

 

 Misc. Devices (DURABLE  Use as directed.  2 each  3  2018    Active



 MEDICAL EQUIPMENT SEE  20-30 mm Hg          



 SIG) MISCIndications:  Compression          



 Acute deep vein  Garments, Knee          



 thrombosis (DVT) of  High, Jobst or          



 femoral vein of left  Equivalent, two          



 lower extremity,  pair          



 Post-phlebitic syndrome            

 

 fluticasone (FLONASE) 50  2 sprays by  16 g  11  2019    Active



 MCG/ACT nasal spray  Nasal route          



   daily          

 

 cetirizine (ZYRTEC) 10  Take 1 tablet by  30 tablet  11  2019
  Active



 MG tablet  mouth daily        0  

 

 metoprolol tartrate  TAKE 1 2 TABLET    2  10/29/2018    Active



 (LOPRESSOR) 25 MG tablet  BY MOUTH EVERY          



   NIGHT          

 

 FOLBEE 2.5-25-1 MG TABS  Take 1 tablet by  30 each  3  2019    Active



   mouth daily          

 

 enoxaparin sodium  Inject 0.8 mLs  14.98 mL  1  2019    Active



 (LOVENOX) 120 MG/0.8ML  into the skin          



 SOLN injection  daily          

 

 meloxicam (MOBIC) 7.5 MG  Take 1 tablet by  90 tablet  3  2019  Active



 tabletIndications: Acute  mouth daily        0  



 deep vein thrombosis            



 (DVT) of femoral vein of            



 left lower extremity,            



 Post-phlebitic syndrome            

 

 gabapentin (NEURONTIN)  Take 1.5 tablets  135 tablet  11  2019  Active



 600 MG tablet  by mouth Three        0  



   times daily          

 

 mycophenolate (CELLCEPT)  Take 1 tablet by  90 tablet  11  10/16/2019  10/14/
202  Active



 500 MG tablet  mouth Three        0  



   times daily with          



   meals          

 

 hydroxychloroquine  Take 1 tablet by  180 tablet  3  10/16/2019    Active



 (PLAQUENIL) 200 MG  mouth Two Times          



 tablet  Daily          

 

 PEG 3350-KCl-Na  The day prior to  4000 mL  0  2019    Active



 Bicarb-NaCl 420 GM Oral  procedure          



 Solution Reconstituted  between 2:30-5          



 (NULYTELY)Indications:  begin drinking          



 Encounter for screening  an 8 ounce glass          



 colonoscopy  every 10-15 min          



   until solution          



   is finished          

 

 Apixaban 5 MG Oral  Take 1 tablet by  60 tablet  5  2020    Active



 Tablet  mouth Two Times          



 (ELIQUIS)Indications:  Daily          



 Antiphospholipid            



 syndrome            

 

 tiZANidine HCl 4 MG Oral  Take 0.5 tablets  30 tablet  2  2020  02/15/
202  Active



 Tablet (ZANAFLEX)  by mouth nightly        0  



documented as of this encounter (statuses as of 2020)



Active Problems







 Patient Care Coordination Note

 

 [facility] Pharmacists manage anticoagulation and ALL anticoagulant 
prescriptions ([phone])



 FOR CURRENT ANTICOAGULANT DOSAGE PLEASE REFER TO MOST RECENT ANTICOAG-VISIT OR 
PHARMACIST TELEPHONE NOTE.









 Problem  Noted Date

 

 Chronic anticoagulation  2019

 

 Antiphospholipid syndrome  2019

 

 Anticoagulation goal of INR 2.5-3  2019









 Overview: 



Formatting of this note might be different from the original.



 DO NOT RESOLVE THIS PROBLEM. USED FOR ANTICOAGULATION MONITORING



 



 ANTICOAGULANT: warfarin



 ANTICOAGULATION INDICATION: recurrent dvt X3 and aplas hyperhomosysteinemia (
most recent  dvt left femoral proximal)



 KNOWN CONFOUNDING FACTORS:



 EXISTING DIETARY INTERACTIONS:



 EXISTING MEDICATION INTERACTIONS: levothyroxine



 DESIRED INR RANGE: 2.5-3 (garcia)



 INR LOCATION: [facility] lab



 DATE STARTED:



 INITIAL ANTICOAG NOTE:



 INTENDED DURATION: chronic



 COMPLETION (anticipated):



 BRIDGING:



 PREGNANCY RISK (Y/N): n



 (If Yes, specify contraception method):



 HYPERCOAG WORKUP?:



 DOAC CANDIDATE?: no



 



 Results for MELIAN BUSTOS (MRN [id number]) as of 3/15/2019 12:48



  Ref. Range 2015 10:30 2015 15:42 2015 10:41



 Cardiolipin IgA Ab Latest Ref Range: 0 - 11 APL  2



 Cardiolipin IgG Ab Latest Ref Range: 0 - 14 GPL  1



 Cardiolipin IgM Ab Latest Ref Range: 0 - 12 MPL  5



 B2 Glycoprotein IgG Latest Ref Range: <=20 SGU  <9



 B2 Glycoprotein IgM Latest Ref Range: <=20 SMU  <9



 B2 Glygoprotein IgA Latest Ref Range: <=20 ANU  <9



 dRVV Ratio Latest Ref Range: <1.20 Ratio   1.42 (H)



 Hex Phase Phospho Neut Latest Ref Range: <10.0 sec 5.2  5.1



 PLATELET NEUTRALIZATION Latest Ref Range: <1.0 sec   0.0









 Chronic maxillary sinusitis  2018

 

 Chronic ethmoidal sinusitis  2018

 

 Nasal turbinate hypertrophy  2018

 

 PONV (postoperative nausea and vomiting)  2018

 

 GERD without esophagitis  2018

 

 Sudden onset of severe abdominal pain  2016

 

 Neck pain  2016

 

 Rash  2015

 

 Venous insufficiency  2015

 

 Post-phlebitic syndrome  2015

 

 Lupus (systemic lupus erythematosus)  08/10/2015

 

 DVT (deep venous thrombosis)  08/10/2015

 

 Antiphospholipid antibody syndrome  08/10/2015

 

 Myofascial muscle pain  2015

 

 Radiculopathy  2014

 

 CTS (carpal tunnel syndrome)  2014

 

 Cervical stenosis of spine  2014



documented as of this encounter (statuses as of 2020)



Immunizations







 Name  Administration Dates  Next Due

 

 Influenza Quad IM with Pres (0.5 mL dose)  10/14/2016  

 

 Pneumococcal Conjugate PCV13  2016  



documented as of this encounter



Social History







 Tobacco Use  Types  Packs/Day  Years Used  Date

 

 Light Tobacco Smoker        









 Smokeless Tobacco: Never Used      









 Comments: one every few years









 Alcohol Use  Drinks/Week  oz/Week  Comments

 

 Yes  3 Cans of beer  3.0  









 Sex Assigned at Birth  Date Recorded

 

 Not on file  









 Job Start Date  Occupation  Industry

 

 Not on file  Not on file  Not on file









 Travel History  Travel Start  Travel End









 No recent travel history available.



documented as of this encounter



Last Filed Vital Signs

Not on filedocumented in this encounter



Plan of Treatment







 Date  Type  Specialty  Care Team  Description

 

 2020  Appointment  Radiology    

 

 2020  Office Visit  Vascular Surgery  José Miguel Fisher MD



  



       [address]



  



       [address]



  



       [place], [place] [id number] [phone] (Fax)  

 

 2020  Office Visit  Rheumatology  Juanpablo Jerez MD



  



       [address] [facility]



  



       [address]



  



       [place], [place] [id number] [phone] (Fax)  









 Health Maintenance  Due Date  Last Done  Comments

 

 MMR Vaccines (1 of 1 -  [birth year]06[birth year]1967    



 Standard series)      

 

 Varicella Vaccines (1 of 2  10/06/1967    



 - 2-dose childhood series)      

 

 DTaP,Tdap,and Td Vaccines  10/06/1973    



 (1 - Tdap)      

 

 HIV Screening  10/06/1979    

 

 Cervical Cancer Screening 5  10/06/1987    



 years      

 

 Pneumococcal Vaccine:  2017  



 Pediatrics (0 to 5 Years)      



 and At-Risk Patients (6 to      



 64 Years) (1 of 1 - PPSV23)      

 

 Influenza Vaccine  10/01/2019  10/14/2016  

 

 Breast Cancer Screening 2  2021, 2018,  



 years    2018, Additional  



     history exists  

 

 Colon Cancer Screening 5  2025, 2016  



 yrs      

 

 Pneumococcal Vaccine: 65+  10/06/2031  2016  



 Years (2 of 2 - PPSV23)      

 

 HIB Vaccines  Aged Out    No longer eligible



       based on patient's age



       to complete this topic

 

 Hepatitis A Vaccines  Aged Out    No longer eligible



       based on patient's age



       to complete this topic

 

 Hepatitis B Vaccines  Aged Out    No longer eligible



       based on patient's age



       to complete this topic

 

 IPV Vaccines  Aged Out    No longer eligible



       based on patient's age



       to complete this topic



documented as of this encounter



Implants







 Implanted  Type  Area    Device  Shelf  Model /



         Identifier  Expiration Date  Serial /



             Lot

 

 [employer] Eviva [id number]. - [id number]    Right:  HOLOGIC    2018  [id number] /



 Implanted: Qty: 1 on 2018 by Bladimir Arita MD at [facility]         /



             [id number]



documented as of this encounter



Procedures







 Procedure Name  Priority  Date/Time  Associated Diagnosis  Comments

 

 COLONOSCOPY    2020 12:00 AM EST    



documented in this encounter



Results

Not on filedocumented in this encounter

## 2020-02-23 NOTE — XMS REPORT
Summary of Care

 Created on:2020



Patient:Marina Bustos

Sex:Female

:1965

External Reference #:[id number]





Demographics







 Address  [address] [place] [address]

 

 Mobile Phone  [phone]

 

 Home Phone  [phone]

 

 Email Address  [email or web address]

 

 Preferred Language  English

 

 Marital Status  Not  or 

 

 Hoahaoism Affiliation  Unknown

 

 Race  White

 

 Ethnic Group  Not  or 









Author







 Organization  [facility]

 

 Address  [address] [place] [address]









Support







 Name  Relationship  Address  Phone

 

 Larry Bustos  Spouse  [address]  [phone]



     [place] [address]  

 

 Javier Bryson  Son  Unavailable  [phone]









Care Team Providers







 Name  Role  Phone

 

 Alexsandra Denny NP  Primary Care Provider  [phone]









Reason for Referral

Pain Medicine (Routine)





 Status  Reason  Specialty  Diagnoses /  Referred By  Referred To



       Procedures  Contact  Contact

 

 Open    Pain Medicine  Diagnoses



Other chronic pain  Leonel Wallace MD



  



         [address] [place] [address]



  



         Phone:  



         [phone]



  



         Fax: [phone]



  



         Email:  



         [email or web address]  



         keith  









 Scheduling Instructions

 

 Get Authorization for orders:          Yes        No







 Make Appointment for procedure:  Yes        No









Reason for Visit







 Reason  Comments

 

 Neck Pain  

 

 Shoulder Pain  right



Pain Medicine (Routine)





 Status  Reason  Specialty  Diagnoses /  Referred By  Referred To



       Procedures  Contact  Contact

 

 Authorized    Pain Medicine  Diagnoses



Cervical radiculopathy



Neck pain



Myofascial muscle pain  Alex Beck MBBS



  



         [address]



  



         [address]



  



         [place], [place]  



         [address]



  



         Phone:  



         [phone]



  



         Fax: [phone]



  



         Email:  



         [email or web address]  



         keith  









Encounter Details







 Date  Type  Department  Care Team  Description

 

 2020  Procedure visit  [facility]  Jatin, P  Other chronic 
pain



     Pain Medicine at Milan Mukherjee MD



  (Primary Dx)



     and [facility]



  [address]



  



     [address]



  [place],  



     [facility] [address]



  [place] [address]



  



     [place], [place]  [phone] [id number] [phone]  (Fax)  







Allergies







 Active Allergy  Reactions  Severity  Noted Date  Comments

 

 Ciprofloxacin  Rash  Low  2014  

 

 Duloxetine  Other (See Comments)    2017  Chest pain

 

 Venlafaxine  Other (See Comments)    2017  Chest Pain

 

 Levofloxacin  Rash  Low  2014  

 

 Atorvastatin  Rash  Low  2014  

 

 Morphine And Related  Rash  Low  2014  



documented as of this encounter (statuses as of 2020)



Medications







 Medication  Sig  Dispensed  Refills  Start Date  End Date  Status

 

 TRICOR 145 MG tablet  Take 145 mg by    0  2014    Active



   mouth daily.          

 

 SYNTHROID 50 MCG  Take 50 mcg by    0  2014    Active



 tablet  mouth daily.          

 

 Ascorbic Acid (VITAMIN  Take 500 mg by    0      Active



 C WITH ANGELITO HIPS) 500  mouth daily.          



 MG tablet            

 

 Diclofenac Sodium 1.5  Apply  topically    0  2015    Active



 % SOLN  as needed.          

 

 ondansetron (ZOFRAN) 4  Take 4 mg by mouth    0  2015    Active



 MG tablet  as needed.          

 

 cyclobenzaprine  Take 2 tablets by  30 tablet  1  2015    Active



 (FLEXERIL) 5 MG tablet  mouth Three times          



   daily as needed.          

 

 magnesium oxide  Take 400 mg by    0      Active



 (MAG-OX) 400 MG tablet  mouth daily.          

 

 B Complex Vitamins  Take 1 tablet by    0  2014    Active



 (VITAMIN B COMPLEX IJ)  mouth daily.          

 

 Cholecalciferol  Take 2,000 Units    0  2012    Active



 (VITAMIN D3) 2000  by mouth daily.          



 UNITS capsule            

 

 Misc. Devices (DURABLE  Use as directed.  2 each  3  2015    Active



 MEDICAL EQUIPMENT SEE  20-30 mm Hg          



 SIG) MISC  compression          



   garments; knee          



   high; 2 pair;          



   Jobst or          



   equivalent          

 

 omeprazole (PRILOSEC)  Take 40 mg by    0      Active



 40 MG capsule  mouth daily.          

 

 loratadine (CLARITIN)  Take 10 mg by    0      Active



 10 MG tablet  mouth daily          

 

 simvastatin (ZOCOR) 10      0  2018    Active



 MG tablet            

 

 maalox/lidocaine/diphe  Swish and spit 10  900 mL  1  2018    Active



 nhydrAMINE (RADIATION  mLs every 4 (four)          



 MIXTURE) 1:1:1 oral  hours as needed          



 suspension  Pharmacy compound:          



   Maalox, lidocaine          



   viscous 2 %,          



   Benadryl 12.5 mg/5          



   mL          









   



   Additional information



   Patient not taking. Reported on 2020  1:19 PM









 carisoprodol (SOMA) 350 MG  Take 1 tablet by  90 tablet  0  2018    
Active



 tabletIndications: Neck pain,  mouth Three times          



 Myofascial muscle pain  daily as needed  for          



   Muscle spasms          

 

 metoprolol (TOPROL-XL) 50 MG 24  Take 50 mg by mouth    0      Active



 hr tablet  daily          

 

 HYDROcodone-acetaminophen  Take 1 tablet by  12 tablet  0  2018    Active



 (LORTAB) 5-325 MG per tablet  mouth every 6 (six)          



   hours as needed ,          



   Max Daily Dose: 4          



   tablets          

 

 budesonide (PULMICORT) 0.25  1 respule in nasal  60 mL  12  10/18/2018    
Active



 MG/2ML nebulizer solution  saline was daily          









   



   Additional information



   Patient not taking. Reported on 2020  1:19 PM









 pantoprazole (PROTONIX) 40  Take 1 tablet by  60 tablet  5  2018    
Active



 MG tabletIndications:  mouth Two times          



 Gastroesophageal reflux  daily before meals          



 disease, esophagitis            



 presence not specified            

 

 Misc. Devices (DURABLE  Use as directed.  2 each  3  2018    Active



 MEDICAL EQUIPMENT SEE SIG)  20-30 mm Hg          



 MISCIndications: Acute deep  Compression          



 vein thrombosis (DVT) of  Garments, Knee          



 femoral vein of left lower  High, Jobst or          



 extremity, Post-phlebitic  Equivalent, two          



 syndrome  pair          

 

 fluticasone (FLONASE) 50  2 sprays by Nasal  16 g  11  2019    Active



 MCG/ACT nasal spray  route daily          

 

 cetirizine (ZYRTEC) 10 MG  Take 1 tablet by  30 tablet  11  2019  Active



 tablet  mouth daily        20  

 

 metoprolol tartrate  TAKE 1 2 TABLET BY    2  10/29/2018    Active



 (LOPRESSOR) 25 MG tablet  MOUTH EVERY NIGHT          

 

 FOLBEE 2.5-25-1 MG TABS  Take 1 tablet by  30 each  3  2019    Active



   mouth daily          

 

 enoxaparin sodium (LOVENOX)  Inject 0.8 mLs into  14.98 mL  1  2019    
Active



 120 MG/0.8ML SOLN injection  the skin daily          









   



   Additional information



   Patient not taking. Reported on 2020  1:19 PM









 meloxicam (MOBIC) 7.5 MG  Take 1 tablet by  90 tablet  3  2019  Active



 tabletIndications: Acute  mouth daily          



 deep vein thrombosis (DVT)            



 of femoral vein of left            



 lower extremity,            



 Post-phlebitic syndrome            

 

 gabapentin (NEURONTIN) 600  Take 1.5 tablets  135 tablet  11  2019  Active



 MG tablet  by mouth Three          



   times daily          

 

 mycophenolate (CELLCEPT)  Take 1 tablet by  90 tablet  11  10/16/2019  10/14/
2020  Active



 500 MG tablet  mouth Three          



   times daily with          



   meals          

 

 hydroxychloroquine  Take 1 tablet by  180 tablet  3  10/16/2019    Active



 (PLAQUENIL) 200 MG tablet  mouth Two Times          



   Daily          

 

 PEG 3350-KCl-Na  The day prior to  4000 mL  0  2019    Active



 Bicarb-NaCl 420 GM Oral  procedure          



 Solution Reconstituted  between 2:30-5          



 (NULYTELY)Indications:  begin drinking          



 Encounter for screening  an 8 ounce glass          



 colonoscopy  every 10-15 min          



   until solution          



   is finished          









   



   Additional information



   Patient not taking. Reported on 2020  1:22 PM









 Apixaban 5 MG Oral Tablet  Take 1 tablet by  60 tablet  5  2020    Active



 (ELIQUIS)Indications:  mouth Two Times          



 Antiphospholipid syndrome  Daily          

 

 tiZANidine HCl 4 MG Oral  Take 0.5 tablets  30 tablet  2  2020  02/15/
202  Active



 Tablet (ZANAFLEX)  by mouth nightly        0  

 

 Amoxicillin-Pot Clavulanate  Take 1 tablet by  20 tablet  0  2020  Active



 875-125 MG Oral Tablet  mouth Two Times        0  



   Daily  for 10          



   days          









   



   Additional information



   Patient not taking. Reported on 2020  1:19 PM









 UNABLE TO FIND  Med Name: CBD gummies    0      Active



documented as of this encounter (statuses as of 2020)



Active Problems







 Patient Care Coordination Note

 

 [facility] Pharmacists manage anticoagulation and ALL anticoagulant 
prescriptions ([phone])



 FOR CURRENT ANTICOAGULANT DOSAGE PLEASE REFER TO MOST RECENT ANTICOAG-VISIT OR 
PHARMACIST TELEPHONE NOTE.









 Problem  Noted Date

 

 Chronic anticoagulation  2019

 

 Antiphospholipid syndrome  2019

 

 Anticoagulation goal of INR 2.5-3  2019









 Overview: 



Formatting of this note might be different from the original.



 DO NOT RESOLVE THIS PROBLEM. USED FOR ANTICOAGULATION MONITORING



 



 ANTICOAGULANT: warfarin



 ANTICOAGULATION INDICATION: recurrent dvt X3 and aplas hyperhomosysteinemia (
most recent  dvt left femoral proximal)



 KNOWN CONFOUNDING FACTORS:



 EXISTING DIETARY INTERACTIONS:



 EXISTING MEDICATION INTERACTIONS: levothyroxine



 DESIRED INR RANGE: 2.5-3 (garcia)



 [facility]



 DATE STARTED:



 INITIAL ANTICOAG NOTE:



 INTENDED DURATION: chronic



 COMPLETION (anticipated):



 BRIDGING:



 PREGNANCY RISK (Y/N): n



 (If Yes, specify contraception method):



 HYPERCOAG WORKUP?:



 DOAC CANDIDATE?: no



 



 Results for MARINA BUSTOS (MRN [id number]) as of 3/15/2019 12:48



  Ref. Range 2015 10:30 2015 15:42 2015 10:41



 Cardiolipin IgA Ab Latest Ref Range: 0 - 11 APL  2



 Cardiolipin IgG Ab Latest Ref Range: 0 - 14 GPL  1



 Cardiolipin IgM Ab Latest Ref Range: 0 - 12 MPL  5



 B2 Glycoprotein IgG Latest Ref Range: <=20 SGU  <9



 B2 Glycoprotein IgM Latest Ref Range: <=20 SMU  <9



 B2 Glygoprotein IgA Latest Ref Range: <=20 ANU  <9



 dRVV Ratio Latest Ref Range: <1.20 Ratio   1.42 (H)



 Hex Phase Phospho Neut Latest Ref Range: <10.0 sec 5.2  5.1



 PLATELET NEUTRALIZATION Latest Ref Range: <1.0 sec   0.0









 Chronic maxillary sinusitis  2018

 

 Chronic ethmoidal sinusitis  2018

 

 Nasal turbinate hypertrophy  2018

 

 PONV (postoperative nausea and vomiting)  2018

 

 GERD without esophagitis  2018

 

 Sudden onset of severe abdominal pain  2016

 

 Neck pain  2016

 

 Rash  2015

 

 Venous insufficiency  2015

 

 Post-phlebitic syndrome  2015

 

 Lupus (systemic lupus erythematosus)  08/10/2015

 

 DVT (deep venous thrombosis)  08/10/2015

 

 Antiphospholipid antibody syndrome  08/10/2015

 

 Myofascial muscle pain  2015

 

 Radiculopathy  2014

 

 CTS (carpal tunnel syndrome)  2014

 

 Cervical stenosis of spine  2014



documented as of this encounter (statuses as of 2020)



Immunizations







 Name  Administration Dates  Next Due

 

 Influenza Quad IM with Pres (0.5 mL dose)  10/14/2016  

 

 Pneumococcal Conjugate PCV13  2016  



documented as of this encounter



Social History







 Tobacco Use  Types  Packs/Day  Years Used  Date

 

 Light Tobacco Smoker        









 Smokeless Tobacco: Never Used      









 Comments: one every few years









 Alcohol Use  Drinks/Week  oz/Week  Comments

 

 Yes  3 Cans of beer  3.0  









 Sex Assigned at Birth  Date Recorded

 

 Not on file  









 Job Start Date  Occupation  Industry

 

 Not on file  Not on file  Not on file









 Travel History  Travel Start  Travel End









 No recent travel history available.



documented as of this encounter



Last Filed Vital Signs







 Vital Sign  Reading  Time Taken  Comments

 

 Blood Pressure  103/71  2020  1:44 PM EST  

 

 Pulse  94  2020  1:44 PM EST  

 

 Temperature  -  -  

 

 Respiratory Rate  -  -  

 

 Oxygen Saturation  -  -  

 

 Inhaled Oxygen Concentration  -  -  

 

 Weight  72.6 kg (160 lb)  2020  1:14 PM EST  

 

 Height  160 cm (5' 3")  2020  1:14 PM EST  

 

 Body Mass Index  28.34  2020  1:14 PM EST  



documented in this encounter



Patient Instructions

Patient InstructionsMallory Marquez RN - 2020  1:30 PM ESTComprehensive 
Pain Medicine

Discharge Instructions



Trigger Point Injection



Your Physician has given you a trigger point injection (s).  A trigger point is 
an area of tenderness related to muscle spasm.  This procedure is done to 
relieve spasm and pain, by injecting medicationdirectly into the area.  The 
local anesthetic medication used will provide a temporary numbing effect.  A 
steroid medication may be used to decrease inflammation and will work slowly 
over the next one to two weeks.



After the injection you may notice the followin. Dizziness, particularly if several spots are injected

2. Increase tenderness in the area of your usual pain



These effects are temporary and will diminish over time.



For injection site tenderness, apply an ice pack to the injection site for 20 
minute period, then remove for one hour.  Repeat as necessary.



Please notify the Pain Center if you notice:

1. Redness, swelling or drainage from the injection sites, or have a fever

2. Prolonged dizziness



It is very important to continue your regular exercise program particularly 
stretching exercises.



Please contact [facility] at [phone] or through [employer], 
should you have any questions or concerns regarding your treatment.

Electronically signed by Mallory Marquez RN at 2020  1:24 PM EST

documented in this encounter



Progress Notes

Leonel Wallace MD - 2020  1:30 PM ESTWe have reviewed cervical MRI 
with patient and have shown her that she has C5-6 disc herniation withright 
foraminal stenosis. We have explained to her that this may be related to her 
symptoms of radicular symptoms down to her hands. We have ordered a cervical 
MINDI to be done ideally at that level.



Pre Procedure check list:





Pre- procedure pain score: 8

Location of pain: mid neck

Name of : no 

Antibiotics (within last 2 weeks): no, has been prescribed abx for possible 
sinus infection

Recent Illness (Including viral or bacteria infections): maybe, she is unsure

Bleeding disorders: no

Blood Thinners: yes

Surgery (within the last 6 weeks):no

Vaccines (flu, hepatitis, meningitis, or pneumonia (within two weeks): no

Eaten today: no

Illegal Drugs: no

Pregnant: no

Diabetes: no



Trigger Point Injection



The history and physical of 2020 was reviewed with the patient.  
Time-out and procedure verification were performed.  The risks and benefits of 
the procedure were explained. The patient appeared to understand and agreed to 
the procedure. An informed consent was obtained. The patient was place in the 
seated position and the trigger points were marked in the cervical paraspinals, 
rhomboid, levator scapulae and trapezius muscle groups on the right side.  The 
area  was prepped and draped in a routine manner.  Using aseptic technique, a 
25g 1.5 inch needle and 7 mL of 0.25% bupivacaine with NO STEROID was injected 
following negative aspiration at these levels; pt prescribed anti biotics by 
her ENT but she has not started it yet.  The procedure was completed without 
apparent difficulty or complications. The patient appeared to tolerate it well.



Post Procedure checklist:

Post procedure pain score: 0



Leonel Wallace M.D.

PGY-3 Resident

Physical Medicine and Rehabilitation



I was present for the entire procedure and I agree with the resident's note.



Electronically signed by DOM Setiner MD at 2020  7:40 AM 
ESTdocumented in this encounter



Plan of Treatment







 Date  Type  Specialty  Care Team  Description

 

 03/10/2020  Office Visit  Ophthalmology  Ryan Stevens MD



  



       [address] [facility]



  



       [address]



  



       [place], [place] [id number] [phone] (Fax)  

 

 2020  Appointment  Radiology    

 

 2020  Office Visit  Vascular Surgery  José Miguel Fisher MD



  



       [address]



  



       [address]



  



       [place] [address]



  



       [phone] (Fax)  

 

 2020  Office Visit  Rheumatology  Juanpablo Jerez MD



  



       [address]



  



       [address]nd Floor [address]



  



       [place] [address]



  



       [phone] (Fax)  









 Name  Type  Priority  Associated Diagnoses  Order Schedule

 

 Orders  Outpatient Referral  Routine  Other chronic pain  Ordered: 2020









 Health Maintenance  Due Date  Last Done  Comments

 

 MMR Vaccines (1 of 1 -  [birth year]06/1967    



 Standard series)      

 

 Varicella Vaccines (1 of 2  10/06/1967    



 - 2-dose childhood series)      

 

 DTaP,Tdap,and Td Vaccines  10/06/1973    



 (1 - Tdap)      

 

 HIV Screening  10/06/1979    

 

 Cervical Cancer Screening 5  10/06/1987    



 years      

 

 Pneumococcal Vaccine:  2017  



 Pediatrics (0 to 5 Years)      



 and At-Risk Patients (6 to      



 64 Years) (1 of 1 - PPSV23)      

 

 Influenza Vaccine  10/01/2019  10/14/2016  

 

 Breast Cancer Screening 2  2021, 2018,  



 years    2018, Additional  



     history exists  

 

 Colon Cancer Screening 5  2025, 2016  



 yrs      

 

 Pneumococcal Vaccine: 65+  10/06/2031  2016  



 Years (2 of 2 - PPSV23)      

 

 HIB Vaccines  Aged Out    No longer eligible



       based on patient's age



       to complete this topic

 

 Hepatitis A Vaccines  Aged Out    No longer eligible



       based on patient's age



       to complete this topic

 

 Hepatitis B Vaccines  Aged Out    No longer eligible



       based on patient's age



       to complete this topic

 

 IPV Vaccines  Aged Out    No longer eligible



       based on patient's age



       to complete this topic



documented as of this encounter



Implants







 Implanted  Type  Area    Device  Shelf  Model /



         Identifier  Expiration Date  Serial /



             Lot

 

 [employer]iva [id number]. - [id number]    Right:  HOLOGIC    2018  [id number] /



 Implanted: Qty: 1 on 2018 by Bladimir Arita MD at [facility] 
INPATIENT    [facility]         /



             [id number]



documented as of this encounter



Results

Not on filedocumented in this encounter



Visit Diagnoses







 Diagnosis

 

 Other chronic pain - Primary



documented in this encounter

## 2020-02-23 NOTE — XMS REPORT
Summary of Care

 Created on:2020



Patient:Marina Bustos

Sex:Female

:1965

External Reference #:[id number]





Demographics







 Address  [address] [place] [address]

 

 Mobile Phone  [phone]

 

 Work Phone  [phone]

 

 Home Phone  [phone]

 

 Email Address  [email or web address]

 

 Preferred Language  English

 

 Marital Status  Not  or 

 

 Sabianist Affiliation  Unknown

 

 Race  White

 

 Ethnic Group  Not  or 









Author







 Organization  [facility]

 

 Address  [address] [place] [address]









Support







 Name  Relationship  Address  Phone

 

 Larry Bustos  Spouse  [address]  [phone]



     [place] [address]  

 

 Javier Bryson  Son  Unavailable  [phone]









Care Team Providers







 Name  Role  Phone

 

 DenishaAlexsandra JJ NP  Primary Care Provider  [phone]









Reason for Referral

Pain Medicine (Routine)





 Status  Reason  Specialty  Diagnoses /  Referred By  Referred To



       Procedures  Contact  Contact

 

 Open    Pain Medicine  Diagnoses



Cervical radiculopathy



Neck pain



Myofascial muscle pain  Alex Beck MBBS



  



         [address]



  



         [address]



  



         [place], [place]  



         [address]



  



         Phone:  



         [phone]



  



         Fax: [phone]



  



         Email:  



         [email or web address]  









 Scheduling Instructions

 

 Get Authorization for orders:          Yes        No







 Make Appointment for procedure:  Yes        No





Diagnostic Radiology (Routine)





 Status  Reason  Specialty  Diagnoses / Procedures  Referred By Contact  
Referred To Contact

 

 Open    Radiology  Diagnoses



Cervical radiculopathy



Neck pain



Myofascial muscle pain  Alex Beck MBBS



  



       



Procedures



MR Cervical Spine without Contrast  [address]



  



         [address]



  



         [place], [place]  



         [address]



  



         Phone: [phone]



  



         Fax: [phone]



  



         Email:  



         [email or web address]  









Reason for Visit







 Reason  Comments

 

 Shoulder Pain  right

 

 Neck Pain  







Encounter Details







 Date  Type  Department  Care Team  Description

 

 2020  Office Visit  DOM Weir  Cervical 
radiculopathy (Primary Dx);



     Pain Medicine at [facility]



  Neck pain;



     [facility]



  [address]



  Myofascial muscle pain



     [address]



  [facility] [address]



  [place] [address]



  



     [place], [place]  [phone] [id number] [phone]  (Fax)  







Allergies







 Active Allergy  Reactions  Severity  Noted Date  Comments

 

 Ciprofloxacin  Rash  Low  2014  

 

 Duloxetine  Other (See Comments)    2017  Chest pain

 

 Venlafaxine  Other (See Comments)    2017  Chest Pain

 

 Levofloxacin  Rash  Low  2014  

 

 Atorvastatin  Rash  Low  2014  

 

 Morphine And Related  Rash  Low  2014  



documented as of this encounter (statuses as of 2020)



Medications







 Medication  Sig  Dispensed  Refills  Start Date  End Date  Status

 

 TRICOR 145 MG tablet  Take 145 mg by    0  2014    Active



   mouth daily.          

 

 SYNTHROID 50 MCG tablet  Take 50 mcg by    0  2014    Active



   mouth daily.          

 

 Ascorbic Acid (VITAMIN C  Take 500 mg by    0      Active



 WITH ANGELITO HIPS) 500 MG  mouth daily.          



 tablet            

 

 Diclofenac Sodium 1.5 %  Apply  topically    0  2015    Active



 SOLN  as needed.          

 

 ondansetron (ZOFRAN) 4  Take 4 mg by    0  2015    Active



 MG tablet  mouth as needed.          

 

 cyclobenzaprine  Take 2 tablets  30 tablet  1  2015    Active



 (FLEXERIL) 5 MG tablet  by mouth Three          



   times daily as          



   needed.          

 

 magnesium oxide (MAG-OX)  Take 400 mg by    0      Active



 400 MG tablet  mouth daily.          

 

 B Complex Vitamins  Take 1 tablet by    0  2014    Active



 (VITAMIN B COMPLEX IJ)  mouth daily.          

 

 Cholecalciferol (VITAMIN  Take 2,000 Units    0  2012    Active



 D3) 2000 UNITS capsule  by mouth daily.          

 

 Misc. Devices (DURABLE  Use as directed.  2 each  3  2015    Active



 MEDICAL EQUIPMENT SEE  20-30 mm Hg          



 SIG) MISC  compression          



   garments; knee          



   high; 2 pair;          



   Jobst or          



   equivalent          

 

 omeprazole (PRILOSEC) 40  Take 40 mg by    0      Active



 MG capsule  mouth daily.          

 

 loratadine (CLARITIN) 10  Take 10 mg by    0      Active



 MG tablet  mouth daily          

 

 simvastatin (ZOCOR) 10      0  2018    Active



 MG tablet            

 

 maalox/lidocaine/diphenh  Swish and spit  900 mL  1  2018    Active



 ydrAMINE (RADIATION  10 mLs every 4          



 MIXTURE) 1:1:1 oral  (four) hours as          



 suspension  needed Pharmacy          



   compound:          



   Maalox,          



   lidocaine          



   viscous 2 %,          



   Benadryl 12.5          



   mg/5 mL          

 

 carisoprodol (SOMA) 350  Take 1 tablet by  90 tablet  0  2018    Active



 MG tabletIndications:  mouth Three          



 Neck pain, Myofascial  times daily as          



 muscle pain  needed  for          



   Muscle spasms          

 

 metoprolol (TOPROL-XL)  Take 50 mg by    0      Active



 50 MG 24 hr tablet  mouth daily          

 

 HYDROcodone-acetaminophe  Take 1 tablet by  12 tablet  0  2018    Active



 n (LORTAB) 5-325 MG per  mouth every 6          



 tablet  (six) hours as          



   needed , Max          



   Daily Dose: 4          



   tablets          

 

 budesonide (PULMICORT)  1 respule in  60 mL  12  10/18/2018    Active



 0.25 MG/2ML nebulizer  nasal saline was          



 solution  daily          

 

 pantoprazole (PROTONIX)  Take 1 tablet by  60 tablet  5  2018    Active



 40 MG tabletIndications:  mouth Two times          



 Gastroesophageal reflux  daily before          



 disease, esophagitis  meals          



 presence not specified            

 

 Misc. Devices (DURABLE  Use as directed.  2 each  3  2018    Active



 MEDICAL EQUIPMENT SEE  20-30 mm Hg          



 SIG) MISCIndications:  Compression          



 Acute deep vein  Garments, Knee          



 thrombosis (DVT) of  High, Jobst or          



 femoral vein of left  Equivalent, two          



 lower extremity,  pair          



 Post-phlebitic syndrome            

 

 fluticasone (FLONASE) 50  2 sprays by  16 g  11  2019    Active



 MCG/ACT nasal spray  Nasal route          



   daily          

 

 cetirizine (ZYRTEC) 10  Take 1 tablet by  30 tablet  11  2019
  Active



 MG tablet  mouth daily        0  

 

 metoprolol tartrate  TAKE 1 2 TABLET    2  10/29/2018    Active



 (LOPRESSOR) 25 MG tablet  BY MOUTH EVERY          



   NIGHT          

 

 FOLBEE 2.5-25-1 MG TABS  Take 1 tablet by  30 each  3  2019    Active



   mouth daily          

 

 enoxaparin sodium  Inject 0.8 mLs  14.98 mL  1  2019    Active



 (LOVENOX) 120 MG/0.8ML  into the skin          



 SOLN injection  daily          

 

 meloxicam (MOBIC) 7.5 MG  Take 1 tablet by  90 tablet  3  2019  Active



 tabletIndications: Acute  mouth daily        0  



 deep vein thrombosis            



 (DVT) of femoral vein of            



 left lower extremity,            



 Post-phlebitic syndrome            

 

 gabapentin (NEURONTIN)  Take 1.5 tablets  135 tablet  11  2019  Active



 600 MG tablet  by mouth Three        0  



   times daily          

 

 mycophenolate (CELLCEPT)  Take 1 tablet by  90 tablet  11  10/16/2019  10/14/
202  Active



 500 MG tablet  mouth Three        0  



   times daily with          



   meals          

 

 hydroxychloroquine  Take 1 tablet by  180 tablet  3  10/16/2019    Active



 (PLAQUENIL) 200 MG  mouth Two Times          



 tablet  Daily          

 

 PEG 3350-KCl-Na  The day prior to  4000 mL  0  2019    Active



 Bicarb-NaCl 420 GM Oral  procedure          



 Solution Reconstituted  between 2:30-5          



 (NULYTELY)Indications:  begin drinking          



 Encounter for screening  an 8 ounce glass          



 colonoscopy  every 10-15 min          



   until solution          



   is finished          

 

 Apixaban 5 MG Oral  Take 1 tablet by  60 tablet  5  2020    Active



 Tablet  mouth Two Times          



 (ELIQUIS)Indications:  Daily          



 Antiphospholipid            



 syndrome            

 

 tiZANidine HCl 4 MG Oral  Take 0.5 tablets  30 tablet  2  2020  02/15/
202  Active



 Tablet (ZANAFLEX)  by mouth nightly        0  



documented as of this encounter (statuses as of 2020)



Active Problems







 Patient Care Coordination Note

 

 [facility] Pharmacists manage anticoagulation and ALL anticoagulant 
prescriptions ([phone])



 FOR CURRENT ANTICOAGULANT DOSAGE PLEASE REFER TO MOST RECENT ANTICOAG-VISIT OR 
PHARMACIST TELEPHONE NOTE.









 Problem  Noted Date

 

 Chronic anticoagulation  2019

 

 Antiphospholipid syndrome  2019

 

 Anticoagulation goal of INR 2.5-3  2019









 Overview: 



Formatting of this note might be different from the original.



 DO NOT RESOLVE THIS PROBLEM. USED FOR ANTICOAGULATION MONITORING



 



 ANTICOAGULANT: warfarin



 ANTICOAGULATION INDICATION: recurrent dvt X3 and aplas hyperhomosysteinemia (
most recent  dvt left femoral proximal)



 KNOWN CONFOUNDING FACTORS:



 EXISTING DIETARY INTERACTIONS:



 EXISTING MEDICATION INTERACTIONS: levothyroxine



 DESIRED INR RANGE: 2.5-3 (garcia)



 INR LOCATION: [facility] lab



 DATE STARTED:



 INITIAL ANTICOAG NOTE:



 INTENDED DURATION: chronic



 COMPLETION (anticipated):



 BRIDGING:



 PREGNANCY RISK (Y/N): n



 (If Yes, specify contraception method):



 HYPERCOAG WORKUP?:



 DOAC CANDIDATE?: no



 



 Results for BUSTOS MARINA P (MRN [id number]) as of 3/15/2019 12:48



  Ref. Range 2015 10:30 2015 15:42 2015 10:41



 Cardiolipin IgA Ab Latest Ref Range: 0 - 11 APL  2



 Cardiolipin IgG Ab Latest Ref Range: 0 - 14 GPL  1



 Cardiolipin IgM Ab Latest Ref Range: 0 - 12 MPL  5



 B2 Glycoprotein IgG Latest Ref Range: <=20 SGU  <9



 B2 Glycoprotein IgM Latest Ref Range: <=20 SMU  <9



 B2 Glygoprotein IgA Latest Ref Range: <=20 ANU  <9



 dRVV Ratio Latest Ref Range: <1.20 Ratio   1.42 (H)



 Hex Phase Phospho Neut Latest Ref Range: <10.0 sec 5.2  5.1



 PLATELET NEUTRALIZATION Latest Ref Range: <1.0 sec   0.0









 Chronic maxillary sinusitis  2018

 

 Chronic ethmoidal sinusitis  2018

 

 Nasal turbinate hypertrophy  2018

 

 PONV (postoperative nausea and vomiting)  2018

 

 GERD without esophagitis  2018

 

 Sudden onset of severe abdominal pain  2016

 

 Neck pain  2016

 

 Rash  2015

 

 Venous insufficiency  2015

 

 Post-phlebitic syndrome  2015

 

 Lupus (systemic lupus erythematosus)  08/10/2015

 

 DVT (deep venous thrombosis)  08/10/2015

 

 Antiphospholipid antibody syndrome  08/10/2015

 

 Myofascial muscle pain  2015

 

 Radiculopathy  2014

 

 CTS (carpal tunnel syndrome)  2014

 

 Cervical stenosis of spine  2014



documented as of this encounter (statuses as of 2020)



Immunizations







 Name  Administration Dates  Next Due

 

 Influenza Quad IM with Pres (0.5 mL dose)  10/14/2016  

 

 Pneumococcal Conjugate PCV13  2016  



documented as of this encounter



Social History







 Tobacco Use  Types  Packs/Day  Years Used  Date

 

 Light Tobacco Smoker        









 Smokeless Tobacco: Never Used      









 Comments: one every few years









 Alcohol Use  Drinks/Week  oz/Week  Comments

 

 Yes  3 Cans of beer  3.0  









 Sex Assigned at Birth  Date Recorded

 

 Not on file  









 Job Start Date  Occupation  Industry

 

 Not on file  Not on file  Not on file









 Travel History  Travel Start  Travel End









 No recent travel history available.



documented as of this encounter



Last Filed Vital Signs







 Vital Sign  Reading  Time Taken  Comments

 

 Blood Pressure  108/70  2020  3:02 PM EST  

 

 Pulse  89  2020  3:02 PM EST  

 

 Temperature  -  -  

 

 Respiratory Rate  -  -  

 

 Oxygen Saturation  -  -  

 

 Inhaled Oxygen Concentration  -  -  

 

 Weight  75.3 kg (166 lb)  2020  3:02 PM EST  

 

 Height  160 cm (5' 3")  2020  3:02 PM EST  

 

 Body Mass Index  29.41  2020  3:02 PM EST  



documented in this encounter



Progress Notes

Alex Beck MBBS - 2020  3:00 PM ESTFormatting of this note might be 
different from the original.

Subjective:



 Patient ID: Marina Bustos is a 54 y.o. female.



HPI

Marina Bustos is a 54 y.o. female with h/o SLE with diffuse arthalgias who 
presents for follow up visit for chronic neck pain secondary to cervical 
radiculopathy and cervical myofascial pain syndrome.Last visit was 2019 
and at that time plan was to switch to Lyrica from Gabapentin (since the 
patient thought the Gabapentin as not helping), she was also given script for 
aquatic PT. She tried to go down on Gabapentin to twice a day however it made 
her pain significantly worse which confirmed Gabapentin was helping hence the 
planned switch was aborted.

She feels that gabapentin is no longer provides relief. She is hesitant about 
increase in gabapentinany further.

She could not go to aquatic therapy because it insurance did not cover it. She 
also had a XR right shoulder done last week to r/o shoulder pathology (related 
to her bowling) - no acute fracture, AC degenerative changes noted.

Of late, she feels that her neck pain has been progressively getting worse. 
Today, she reports having neck pain 5/10 along with radiation into b/l arms 
upto the hands, right &gt;&gt; left. There is associated numbness in right arm 
and hands. There is also generalized joint stiffness (related to SLE). The pain 
is aggravated by activity, driving reading and neck movements. It is alleviated 
by rest and heat



Prior injections: 2 CESBs, TPI, myobloc. All of them helped, however insurance 
company denied coverage for myobloc.



Current meds: Gabapentin 900 mg tid, Mobic 7.5 mg once a day PRN.



She has h/o DVT and is on blood thinners.



Marina  has a past medical history of Allergy, unspecified not elsewhere 
classified, Arrhythmia, Arthritis, Clotting disorder, CTS (carpal tunnel 
syndrome), GERD (gastroesophageal reflux disease), Lupus, PONV (postoperative 
nausea and vomiting) (2018), SVT (supraventricular tachycardia), Thyroid 
disease, and Ulcer.

Marina has Cervical stenosis of spine; Radiculopathy; CTS (carpal tunnel syndrome
); Myofascial musclepain; Lupus (systemic lupus erythematosus); DVT (deep 
venous thrombosis); Antiphospholipid antibody syndrome; Venous insufficiency; 
Post-phlebitic syndrome; Rash; Neck pain; Sudden onset of severe abdominal pain
; GERD without esophagitis; PONV (postoperative nausea and vomiting); Chronic 
maxillary sinusitis; Chronic ethmoidal sinusitis; Nasal turbinate hypertrophy; 
Antiphospholipid syndrome; Anticoagulation goal of INR 2.5-3; and Chronic 
anticoagulation on their problem list.

Marina  has a past surgical history that includes Hysterectomy (); 
Appendectomy (Age 15?); Cholecystectomy (); Colonoscopy (?); 
Oophorectomy (Right, ); Tonsillectomy (); Tubal ligation (?); and 
pr nasal/sinus endoscopy,rmv tiss maxill sinus (Bilateral, 2018).

Her family history includes Alcohol abuse in her father; Arthritis in her 
father and mother; Asthma in her father; COPD in her father and mother; Cancer 
in her father and mother; Depression in her father and mother; Diabetes in her 
father and mother; Early death in her mother; Heart disease in her father and 
mother; Hyperlipidemia in her father and mother; Hypertension in her father and 
mother; Kidney disease in her father; Mental illness in her father; Stroke in 
her father and mother.

Marina  reports that she has been smoking. She has never used smokeless tobacco. 
She reports current alcohol use of about 3.0 standard drinks of alcohol per 
week. She reports that she does not use drugs.

Marina has a current medication list which includes the following prescription(s)
: apixaban, vitamin c with angelito hips, b complex vitamins, budesonide, 
carisoprodol, cetirizine, vitamin d3, cyclobenzaprine, diclofenac sodium, 
enoxaparin sodium, fluticasone, folbee, gabapentin, hydrocodone-acetaminophen,
hydroxychloroquine, loratadine, maalox/lidocaine/diphenhydramine, magnesium 
oxide, meloxicam, metoprolol, metoprolol tartrate, durable medical equipment 
see sig, durable medical equipment see sig, mycophenolate, omeprazole, 
ondansetron, pantoprazole, polyethylene glycol-electrolytes, simvastatin, 
synthroid, and tricor.

Current Outpatient Medications on File Prior to Visit

Medication Sig Dispense Refill

 Apixaban 5 MG Oral Tablet (ELIQUIS) Take 1 tablet by mouth Two Times 
Daily 60 tablet 5

 Ascorbic Acid (VITAMIN C WITH ANGELITO HIPS) 500 MG tablet Take 500 mg by 
mouth daily.

 B Complex Vitamins (VITAMIN B COMPLEX IJ) Take 1 tablet by mouth daily.

 budesonide (PULMICORT) 0.25 MG/2ML nebulizer solution 1 respule in nasal 
saline was daily 60 mL 12

 carisoprodol (SOMA) 350 MG tablet Take 1 tablet by mouth Three times 
daily as needed  for Muscle spasms 90 tablet 0

 cetirizine (ZYRTEC) 10 MG tablet Take 1 tablet by mouth daily 30 tablet 
11

 Cholecalciferol (VITAMIN D3) 2000 UNITS capsule Take 2,000 Units by 
mouth daily.

 cyclobenzaprine (FLEXERIL) 5 MG tablet Take 2 tablets by mouth Three 
times daily as needed. 30 tablet 1

 Diclofenac Sodium 1.5 % SOLN Apply  topically as needed.

 enoxaparin sodium (LOVENOX) 120 MG/0.8ML SOLN injection Inject 0.8 mLs 
into the skin daily 14.98 mL 1

 fluticasone (FLONASE) 50 MCG/ACT nasal spray 2 sprays by Nasal route 
daily 16 g 11

 FOLBEE 2.5-25-1 MG TABS Take 1 tablet by mouth daily 30 each 3

 gabapentin (NEURONTIN) 600 MG tablet Take 1.5 tablets by mouth Three 
times daily 135 tablet 11

 HYDROcodone-acetaminophen (LORTAB) 5-325 MG per tablet Take 1 tablet by 
mouth every 6 (six) hours as needed , Max Daily Dose: 4 tablets 12 tablet 0

 hydroxychloroquine (PLAQUENIL) 200 MG tablet Take 1 tablet by mouth Two 
Times Daily 180 tablet 3

 loratadine (CLARITIN) 10 MG tablet Take 10 mg by mouth daily

 maalox/lidocaine/diphenhydrAMINE (RADIATION MIXTURE) 1:1:1 oral 
suspension Swish and spit 10 mLs every 4 (four) hours as needed Pharmacy 
compound: Maalox, lidocaine viscous 2 %, Benadryl 12.5 mg/5 mL 900 mL 1

 magnesium oxide (MAG-OX) 400 MG tablet Take 400 mg by mouth daily.

 meloxicam (MOBIC) 7.5 MG tablet Take 1 tablet by mouth daily 90 tablet 3

 metoprolol (TOPROL-XL) 50 MG 24 hr tablet Take 50 mg by mouth daily

 metoprolol tartrate (LOPRESSOR) 25 MG tablet TAKE 1 2 TABLET BY MOUTH 
EVERY NIGHT  2

 Misc. Devices (DURABLE MEDICAL EQUIPMENT SEE SIG) MISC Use as directed. 
20-30 mm Hg compression garments; knee high; 2 pair; Jobst or equivalent 2 each 
3

 Misc. Devices (DURABLE MEDICAL EQUIPMENT SEE SIG) MISC Use as directed. 
20-30 mm Hg Compression Garments, Knee High, Jobst or Equivalent, two pair 2 
each 3

 mycophenolate (CELLCEPT) 500 MG tablet Take 1 tablet by mouth Three 
times daily with meals 90tablet 11

 omeprazole (PRILOSEC) 40 MG capsule Take 40 mg by mouth daily.

 ondansetron (ZOFRAN) 4 MG tablet Take 4 mg by mouth as needed.

 pantoprazole (PROTONIX) 40 MG tablet Take 1 tablet by mouth Two times 
daily before meals 60 tablet 5

 PEG 3350-KCl-Na Bicarb-NaCl 420 GM Oral Solution Reconstituted (NULYTELY
) The day prior to procedure between 2:30-5 begin drinking an 8 ounce glass 
every 10-15 min until solution is finished 4000 mL 0

 simvastatin (ZOCOR) 10 MG tablet

 SYNTHROID 50 MCG tablet Take 50 mcg by mouth daily.

 TRICOR 145 MG tablet Take 145 mg by mouth daily.



No current facility-administered medications on file prior to visit.



Marina is allergic to duloxetine; effexor [venlafaxine]; ciprofloxacin; levaquin 
[levofloxacin]; lipitor [atorvastatin]; and morphine and related.



Review of Systems

Constitutional: Negative for chills and fever.

Respiratory: Negative for chest tightness and shortness of breath.

Cardiovascular: Negative for chest pain and palpitations.

Musculoskeletal: Positive for arthralgias, myalgias, neck pain and neck 
stiffness.

Neurological: Positive for numbness (in the right hand). Negative for weakness.

Psychiatric/Behavioral: Negative for sleep disturbance.





Objective:



Visit Vitals

LMP 1995 (LMP Unknown)



Physical Exam

Vitals signs and nursing note reviewed.

Constitutional:

   Appearance: She is well-developed.

HENT:

   Head: Normocephalic and atraumatic.

Pulmonary:

   Effort: Pulmonary effort is normal.

Musculoskeletal:

   General: Tenderness present.

   Cervical back: She exhibits decreased range of motion, tenderness and pain.

     Back:



Neurological:

   Mental Status: She is alert and oriented to person, place, and time.

Psychiatric:

   Behavior: Behavior normal.



Trigger points identified in the cervical and trap bilaterally.





Assessment:



Marina Bustos is a 54 y.o. female with h/o SLE with diffuse arthalgias who 
presents for follow up visit for chronic neck pain secondary to cervical 
radiculopathy and cervical myofascial pain syndrome.Lately her neck pain has 
been worsening with radiation into the right arm despite home exercises 
andmedications. This is likely related to progression of her cervical 
spondylosis. Last MRI C spine wasin , hence will order MRI C spine to look 
for interval progression.



Plan:



1.Procedure/Intervention:

 - Plan for cervical paraspinal and trap b/l TPIs, will place authorization 
request. She may need cervical MINDI after MRI C spine or if TPIs are not 
effective. Note she is on Eliquis for recurrent DVTs and hence will need 
bridging for cervical ESIs.



2. Medications

 -Continue current medications, advised against excessive Mobic use due to 
bleeding potential. She can alternate with Tylenol. C/w Gabapentin and Voltaren 
gel. Will add Xanaflex 2 mg at night.



3. Conservative measures and therapy:

          -The patient was encouraged to continue with conservative management 
of their pain. This includes interventions like heat/ice, meditation, 
biofeedback, relaxation techniques and behavior training. They can offer 
benefits with minimal side effects.



4. Imaging:

          - Prior imaging results - MRI C spine- reviewed today. MRI C spine 
ordered as above.



5. Patient counseling:

           - Discussed risks and benefits of medications including Mobic and 
Tizanidine (including interaction with Gabapentin) and upcoming procedure. Four 
A's of pain treatment were also elaborated on during the discussion.



6. Referral:

             - No new referrals placed today.



7. Follow-up

 -Return to clinic for TPIs- cervical paraspinal and trap b/l.



Alex Beck

Pain Management Fellow



Patient seen and plan discussed with Dr. Steiner.





I saw and evaluated the patient. Discussed with the resident and agree with the 
residents findings as documented in the resident's note. A follow-up plan is 
necessary at this time.

Electronically signed by DOM Steiner MD at 2020  9:06 AM 
ESTdocumented in this encounter



Plan of Treatment







 Date  Type  Specialty  Care Team  Description

 

 2020  Procedure visit  Gastroenterology  Mily Camarillo MD



  



       [address]



  



       [address]



  



       [place], [place] [id number] [phone] (Fax)  

 

 2020  Office Visit  Vascular Surgery  José Miguel Fisher MD



  



       [address]



  



       [address]



  



       [place] [address]



  



       [phone] (Fax)  

 

 2020  Appointment  Radiology    

 

 2020  Office Visit  Rheumatology  Juanpablo Jerez MD



  



       [address] [facility]



  



       [address]nd Floor [address]



  



       [place], [place] [id number] [phone] (Fax)  









 Name  Type  Priority  Associated Diagnoses  Order Schedule

 

 MR Cervical Spine  Imaging  Routine  Cervical radiculopathy



  Expected: 2020,



 without Contrast      Neck pain



  Expires: 2021



       Myofascial muscle pain  









 Name  Type  Priority  Associated Diagnoses  Order Schedule

 

 Orders  Outpatient Referral  Routine  Cervical radiculopathy



  Ordered: 2020



       Neck pain



  



       Myofascial muscle pain  









 Health Maintenance  Due Date  Last Done  Comments

 

 MMR Vaccines (1 of 1 -  [birth year]1967    



 Standard series)      

 

 Varicella Vaccines (1 of 2  10/06/1967    



 - 2-dose childhood series)      

 

 DTaP,Tdap,and Td Vaccines  10/06/1973    



 (1 - Tdap)      

 

 HIV Screening  10/06/1979    

 

 Cervical Cancer Screening 5  10/06/1987    



 years      

 

 Pneumococcal Vaccine:  2017  



 Pediatrics (0 to 5 Years)      



 and At-Risk Patients (6 to      



 64 Years) (1 of 1 - PPSV23)      

 

 Influenza Vaccine  10/01/2019  10/14/2016  

 

 Breast Cancer Screening 2  2021, 2018,  



 years    2018, Additional  



     history exists  

 

 Colon Cancer Screening 10  2026  



 yrs      

 

 Pneumococcal Vaccine: 65+  10/06/2031  2016  



 Years (2 of 2 - PPSV23)      

 

 HIB Vaccines  Aged Out    No longer eligible



       based on patient's age



       to complete this topic

 

 Hepatitis A Vaccines  Aged Out    No longer eligible



       based on patient's age



       to complete this topic

 

 Hepatitis B Vaccines  Aged Out    No longer eligible



       based on patient's age



       to complete this topic

 

 IPV Vaccines  Aged Out    No longer eligible



       based on patient's age



       to complete this topic



documented as of this encounter



Implants







 Implanted  Type  Area    Device  Shelf  Model /



         Identifier  Expiration Date  Serial /



             Lot

 

 [employer]-S[employer]jav[employer] Charbel [id number]. - [id number]    Right:  HOLOGIC    2018  [id number] /



 Implanted: Qty: 1 on 2018 by Bladimir Arita MD at [facility]         /



             [id number]



documented as of this encounter



Results

Not on filedocumented in this encounter



Visit Diagnoses







 Diagnosis

 

 Cervical radiculopathy - Primary







 Brachial neuritis or radiculitis nos

 

 Neck pain







 Cervicalgia

 

 Myofascial muscle pain







 Mylagia and myositis, unspecified



documented in this encounter

## 2020-02-23 NOTE — XMS REPORT
Summary of Care

 Created on:2020



Patient:Marina Bustos

Sex:Female

:1965

External Reference #:[id number]





Demographics







 Address  [address] [place] [address]

 

 Mobile Phone  [phone]

 

 Work Phone  [phone]

 

 Home Phone  [phone]

 

 Email Address  [email or web address]

 

 Preferred Language  English

 

 Marital Status  Not  or 

 

 Protestant Affiliation  Unknown

 

 Race  White

 

 Ethnic Group  Not  or 









Author







 Organization  [facility]

 

 Address  [address] [place] [address]









Support







 Name  Relationship  Address  Phone

 

 Larry Bustos  Spouse  [address]  [phone]



     [place] [address]  

 

 Javier Bryson  Son  Unavailable  [phone]









Care Team Providers







 Name  Role  Phone

 

 DenishaAlexsandra JJ NP  Primary Care Provider  [phone]









Reason for Visit







 Reason  Comments

 

 Follow-up  







Encounter Details







 Date  Type  Department  Care Team  Description

 

 2020  Office Visit  Aria Jerez  SLE (systemic lupus 
erythematosus related syndrome) (Primary Dx);



     Rheumatology



  MD Jenny



  Long-term use of Plaquenil;



     [address]  High risk medication use;



     [place], [place]  [place]



  Anticoagulated



     [address]



  [address]



  



     [phone]  [place] [address]



  



       [phone] (Fax)  







Allergies







 Active Allergy  Reactions  Severity  Noted Date  Comments

 

 Ciprofloxacin  Rash  Low  2014  

 

 Duloxetine  Other (See Comments)    2017  Chest pain

 

 Venlafaxine  Other (See Comments)    2017  Chest Pain

 

 Levofloxacin  Rash  Low  2014  

 

 Atorvastatin  Rash  Low  2014  

 

 Morphine And Related  Rash  Low  2014  



documented as of this encounter (statuses as of 2020)



Medications







 Medication  Sig  Dispensed  Refills  Start  End  Status



         Date  Date  

 

 TRICOR 145 MG tablet  Take 145 mg by    0  20    Active



   mouth daily.      14    

 

 SYNTHROID 50 MCG  Take 50 mcg by    0  20    Active



 tablet  mouth daily.      14    

 

 Ascorbic Acid (VITAMIN  Take 500 mg by    0      Active



 C WITH ANGELITO HIPS) 500  mouth daily.          



 MG tablet            

 

 Diclofenac Sodium 1.5  Apply    0  20    Active



 % SOLN  topically as      15    



   needed.          

 

 ondansetron (ZOFRAN) 4  Take 4 mg by    0  20    Active



 MG tablet  mouth as      15    



   needed.          

 

 cyclobenzaprine  Take 2 tablets  30 tablet  1  20    Active



 (FLEXERIL) 5 MG tablet  by mouth Three      15    



   times daily as          



   needed.          

 

 magnesium oxide  Take 400 mg by    0      Active



 (MAG-OX) 400 MG tablet  mouth daily.          

 

 B Complex Vitamins  Take 1 tablet    0  20    Active



 (VITAMIN B COMPLEX IJ)  by mouth      14    



   daily.          

 

 Cholecalciferol  Take 2,000    0  20    Active



 (VITAMIN D3) 2000  Units by mouth      12    



 UNITS capsule  daily.          

 

 Misc. Devices (DURABLE  Use as  2 each  3  20    Active



 MEDICAL EQUIPMENT SEE  directed.      15    



 SIG) MISC  20-30 mm Hg          



   compression          



   garments; knee          



   high; 2 pair;          



   Jobst or          



   equivalent          

 

 omeprazole (PRILOSEC)  Take 40 mg by    0      Active



 40 MG capsule  mouth daily.          

 

 loratadine (CLARITIN)  Take 10 mg by    0      Active



 10 MG tablet  mouth daily          

 

 simvastatin (ZOCOR) 10      0  20    Active



 MG tablet        18    

 

 maalox/lidocaine/diphe  Swish and spit  900 mL  1  20    Active



 nhydrAMINE (RADIATION  10 mLs every 4      18    



 MIXTURE) 1:1:1 oral  (four) hours          



 suspension  as needed          



   Pharmacy          



   compound:          



   Maalox,          



   lidocaine          



   viscous 2 %,          



   Benadryl 12.5          



   mg/5 mL          

 

 carisoprodol (SOMA)  Take 1 tablet  90 tablet  0  20    Active



 350 MG  by mouth Three      18    



 tabletIndications:  times daily as          



 Neck pain, Myofascial  needed  for          



 muscle pain  Muscle spasms          

 

 metoprolol (TOPROL-XL)  Take 50 mg by    0      Active



 50 MG 24 hr tablet  mouth daily          

 

 HYDROcodone-acetaminop  Take 1 tablet  12 tablet  0  20    Active



 hen (LORTAB) 5-325 MG  by mouth every      18    



 per tablet  6 (six) hours          



   as needed ,          



   Max Daily          



   Dose: 4          



   tablets          

 

 budesonide (PULMICORT)  1 respule in  60 mL  12  10/18/20    Active



 0.25 MG/2ML nebulizer  nasal saline      18    



 solution  was daily          

 

 pantoprazole  Take 1 tablet  60 tablet  5  20    Active



 (PROTONIX) 40 MG  by mouth Two      18    



 tabletIndications:  times daily          



 Gastroesophageal  before meals          



 reflux disease,            



 esophagitis presence            



 not specified            

 

 Misc. Devices (DURABLE  Use as  2 each  3  20    Active



 MEDICAL EQUIPMENT SEE  directed.      18    



 SIG) MISCIndications:  20-30 mm Hg          



 Acute deep vein  Compression          



 thrombosis (DVT) of  Garments, Knee          



 femoral vein of left  High, Jobst or          



 lower extremity,  Equivalent,          



 Post-phlebitic  two pair          



 syndrome            

 

 fluticasone (FLONASE)  2 sprays by  16 g  11  20    Active



 50 MCG/ACT nasal spray  Nasal route      19    



   daily          

 

 cetirizine (ZYRTEC) 10  Take 1 tablet  30 tablet  11  20  Active



 MG tablet  by mouth daily        020  

 

 metoprolol tartrate  TAKE 1 2    2  10/29/20    Active



 (LOPRESSOR) 25 MG  TABLET BY      18    



 tablet  MOUTH EVERY          



   NIGHT          

 

 FOLBEE 2.5-25-1 MG  Take 1 tablet  30 each  3  20    Active



 TABS  by mouth daily      19    

 

 enoxaparin sodium  Inject 0.8 mLs  14.98 mL  1  20    Active



 (LOVENOX) 120 MG/0.8ML  into the skin      19    



 SOLN injection  daily          

 

 meloxicam (MOBIC) 7.5  Take 1 tablet  90 tablet  3  20  Active



 MG tabletIndications:  by mouth daily        020  



 Acute deep vein            



 thrombosis (DVT) of            



 femoral vein of left            



 lower extremity,            



 Post-phlebitic            



 syndrome            

 

 gabapentin (NEURONTIN)  Take 1.5  135 tablet  11  20  Active



 600 MG tablet  tablets by        020  



   mouth Three          



   times daily          

 

 mycophenolate  Take 1 tablet  90 tablet  11  10/16/20  10/14/2  Active



 (CELLCEPT) 500 MG  by mouth Three        020  



 tablet  times daily          



   with meals          

 

 hydroxychloroquine  Take 1 tablet  180 tablet  3  10/16/20    Active



 (PLAQUENIL) 200 MG  by mouth Two      19    



 tablet  Times Daily          

 

 PEG 3350-KCl-Na  The day prior  4000 mL  0  20    Active



 Bicarb-NaCl 420 GM  to procedure      19    



 Oral Solution  between 2:30-5          



 Reconstituted  begin drinking          



 (NULYTELY)Indications:  an 8 ounce          



 Encounter for  glass every          



 screening colonoscopy  10-15 min          



   until solution          



   is finished          

 

 apixaban (ELIQUIS) 5  Take 1 tablet  60 tablet  5  08/20/20  01/09/2  
Discontinued



 MG tabletIndications:  by mouth Two      19  020  (Reorder)



 Antiphospholipid  Times Daily          



 syndrome            



documented as of this encounter (statuses as of 2020)



Active Problems







 Patient Care Coordination Note

 

 [facility] Pharmacists manage anticoagulation and ALL anticoagulant 
prescriptions [phone])



 FOR CURRENT ANTICOAGULANT DOSAGE PLEASE REFER TO MOST RECENT ANTICOAG-VISIT OR 
PHARMACIST TELEPHONE NOTE.









 Problem  Noted Date

 

 Chronic anticoagulation  2019

 

 Antiphospholipid syndrome  2019

 

 Anticoagulation goal of INR 2.5-3  2019









 Overview: 



Formatting of this note might be different from the original.



 DO NOT RESOLVE THIS PROBLEM. USED FOR ANTICOAGULATION MONITORING



 



 ANTICOAGULANT: warfarin



 ANTICOAGULATION INDICATION: recurrent dvt X3 and aplas hyperhomosysteinemia (
most recent  dvt left femoral proximal)



 KNOWN CONFOUNDING FACTORS:



 EXISTING DIETARY INTERACTIONS:



 EXISTING MEDICATION INTERACTIONS: levothyroxine



 DESIRED INR RANGE: 2.5-3 (murillo)



 INR LOCATION: [facility] lab



 DATE STARTED:



 INITIAL ANTICOAG NOTE:



 INTENDED DURATION: chronic



 COMPLETION (anticipated):



 BRIDGING:



 PREGNANCY RISK (Y/N): n



 (If Yes, specify contraception method):



 HYPERCOAG WORKUP?:



 DOAC CANDIDATE?: no



 



 Results for MARINA BUSTOS DOM (MRN [id number]) as of 3/15/2019 12:48



  Ref. Range 2015 10:30 2015 15:42 2015 10:41



 Cardiolipin IgA Ab Latest Ref Range: 0 - 11 APL  2



 Cardiolipin IgG Ab Latest Ref Range: 0 - 14 GPL  1



 Cardiolipin IgM Ab Latest Ref Range: 0 - 12 MPL  5



 B2 Glycoprotein IgG Latest Ref Range: <=20 SGU  <9



 B2 Glycoprotein IgM Latest Ref Range: <=20 SMU  <9



 B2 Glygoprotein IgA Latest Ref Range: <=20 ANU  <9



 dRVV Ratio Latest Ref Range: <1.20 Ratio   1.42 (H)



 Hex Phase Phospho Neut Latest Ref Range: <10.0 sec 5.2  5.1



 PLATELET NEUTRALIZATION Latest Ref Range: <1.0 sec   0.0









 Chronic maxillary sinusitis  2018

 

 Chronic ethmoidal sinusitis  2018

 

 Nasal turbinate hypertrophy  2018

 

 PONV (postoperative nausea and vomiting)  2018

 

 GERD without esophagitis  2018

 

 Sudden onset of severe abdominal pain  2016

 

 Neck pain  2016

 

 Rash  2015

 

 Venous insufficiency  2015

 

 Post-phlebitic syndrome  2015

 

 Lupus (systemic lupus erythematosus)  08/10/2015

 

 DVT (deep venous thrombosis)  08/10/2015

 

 Antiphospholipid antibody syndrome  08/10/2015

 

 Myofascial muscle pain  2015

 

 Radiculopathy  2014

 

 CTS (carpal tunnel syndrome)  2014

 

 Cervical stenosis of spine  2014



documented as of this encounter (statuses as of 2020)



Immunizations







 Name  Administration Dates  Next Due

 

 Influenza Quad IM with Pres (0.5 mL dose)  10/14/2016  

 

 Pneumococcal Conjugate PCV13  2016  



documented as of this encounter



Social History







 Tobacco Use  Types  Packs/Day  Years Used  Date

 

 Light Tobacco Smoker        









 Smokeless Tobacco: Never Used      









 Tobacco Cessation: Ready to Quit: No; Counseling Given: No



 Comments: one every few years









 Alcohol Use  Drinks/Week  oz/Week  Comments

 

 Yes  3 Cans of beer  3.0  









 Sex Assigned at Birth  Date Recorded

 

 Not on file  









 Job Start Date  Occupation  Industry

 

 Not on file  Not on file  Not on file









 Travel History  Travel Start  Travel End









 No recent travel history available.



documented as of this encounter



Last Filed Vital Signs







 Vital Sign  Reading  Time Taken  Comments

 

 Blood Pressure  114/55  2020  3:42 PM EST  

 

 Pulse  88  2020  3:42 PM EST  

 

 Temperature  36.9 

  2020  3:42 PM EST  



   C (98.4 

    



   F)    

 

 Respiratory Rate  18  2020  3:42 PM EST  

 

 Oxygen Saturation  97%  2020  3:42 PM EST  

 

 Inhaled Oxygen Concentration  -  -  

 

 Weight  78 kg (172 lb)  2020  3:42 PM EST  

 

 Height  160 cm (5' 3")  2020  3:42 PM EST  

 

 Body Mass Index  30.47  2020  3:42 PM EST  



documented in this encounter



Progress Notes

Jenny Jerez MD - 2020  4:00 PM ESTFormatting of this note 
might be different from the original.

Subjective:



 Patient ID: Marina Bustos is a 54 y.o. female.

 Dictation on: 2020  4:15 PM by: JENNY MURILLO [[id number]]



SHANIQUA Gray  has a past medical history of Allergy, unspecified not elsewhere 
classified, Arrhythmia, Arthritis, Clotting disorder, CTS (carpal tunnel 
syndrome), GERD (gastroesophageal reflux disease), Lupus, PONV (postoperative 
nausea and vomiting) (2018), SVT (supraventricular tachycardia), Thyroid 
disease, and Ulcer.

Marina  has a past surgical history that includes Hysterectomy (); 
Appendectomy (Age 15?); Cholecystectomy (); Colonoscopy (?); 
Oophorectomy (Right, ); Tonsillectomy (); Tubal ligation (?); and 
pr nasal/sinus endoscopy,rmv tiss maxill sinus (Bilateral, 2018).

Her family history includes Alcohol abuse in her father; Arthritis in her 
father and mother; Asthma in her father; COPD in her father and mother; Cancer 
in her father and mother; Depression in her father and mother; Diabetes in her 
father and mother; Early death in her mother; Heart disease in her father and 
mother; Hyperlipidemia in her father and mother; Hypertension in her father and 
mother; Kidney disease in her father; Mental illness in her father; Stroke in 
her father and mother.

Marina  reports that she has been smoking. She has never used smokeless tobacco. 
She reports current alcohol use of about 3.0 standard drinks of alcohol per 
week. She reports that she does not use drugs.

Marina has a current medication list which includes the following prescription(s)
: apixaban, vitamin c with angelito hips, b complex vitamins, budesonide, 
carisoprodol, cetirizine, vitamin d3, cyclobenzaprine, diclofenac sodium, 
enoxaparin sodium, fluticasone, folbee, gabapentin, hydrocodone-acetaminophen,
hydroxychloroquine, loratadine, maalox/lidocaine/diphenhydramine, magnesium 
oxide, meloxicam, metoprolol, metoprolol tartrate, durable medical equipment 
see sig, durable medical equipment see sig, mycophenolate, omeprazole, 
ondansetron, pantoprazole, polyethylene glycol-electrolytes, simvastatin, 
synthroid, and tricor.

Marina is allergic to duloxetine; effexor [venlafaxine]; ciprofloxacin; levaquin 
[levofloxacin]; lipitor [atorvastatin]; and morphine and related.



Review of Systems

Constitutional: Negative.

HENT: Negative.

Eyes: Negative.

Respiratory: Negative.

Cardiovascular: Negative.

Gastrointestinal: Negative.

Endocrine: Negative.

Genitourinary: Negative.

Musculoskeletal: Positive for joint swelling. Negative for arthralgias.

Skin: Negative.

Allergic/Immunologic: Negative.

Neurological: Negative.

Hematological: Negative.

Psychiatric/Behavioral: Negative.





Objective:





Physical Exam

Vitals signs reviewed.

Constitutional:

   Appearance: She is well-developed.

HENT:

   Head: Normocephalic and atraumatic.

Eyes:

   Conjunctiva/sclera: Conjunctivae normal.

Neck:

   Thyroid: No thyromegaly.

   Trachea: No tracheal deviation.

Cardiovascular:

   Rate and Rhythm: Normal rate and regular rhythm.

Pulmonary:

   Effort: Pulmonary effort is normal. No respiratory distress.

Musculoskeletal:

   General: Swelling and tenderness present.

Skin:

   General: Skin is warm and dry.

Neurological:

   Mental Status: She is alert and oriented to person, place, and time.



Electronically signed by Jenny Jerez MD at 2020 11:18 AM 
ESTdocumented in this encounter



Plan of Treatment







 Date  Type  Specialty  Care Team  Description

 

 2020  Office Visit  Pain Medicine    

 

 2020  Procedure visit  Gastroenterology  Mily Camarillo MD



  



       [address] [facility]



  



       [address]



  



       [place], [place] [id number] [phone] (Fax)  

 

 2020  Office Visit  Vascular Surgery  José Miguel Fisher MD



  



       [address] [facility]



  



       [address]



  



       [place] [address]



  



       [phone] (Fax)  

 

 2020  Appointment  Radiology    

 

 2020  Office Visit  Rheumatology  Jenny Jerez MD



  



       [address] [facility]



  



       2nd Floor [address]



  



       [place] [address]



  



       [phone] (Fax)  









 Health Maintenance  Due Date  Last Done  Comments

 

 MMR Vaccines (1 of 1 -  10[birth year]06/1967    



 Standard series)      

 

 Varicella Vaccines (1 of 2  10/06/1967    



 - 2-dose childhood series)      

 

 DTaP,Tdap,and Td Vaccines  10/06/1973    



 (1 - Tdap)      

 

 HIV Screening  10/06/1979    

 

 Cervical Cancer Screening 5  10/06/1987    



 years      

 

 Pneumococcal Vaccine:  2017  



 Pediatrics (0 to 5 Years)      



 and At-Risk Patients (6 to      



 64 Years) (1 of 1 - PPSV23)      

 

 Influenza Vaccine  10/01/2019  10/14/2016  

 

 Breast Cancer Screening 2  2021, 2018,  



 years    2018, Additional  



     history exists  

 

 Colon Cancer Screening 10  2026  



 yrs      

 

 Pneumococcal Vaccine: 65+  10/06/2031  2016  



 Years (2 of 2 - PPSV23)      

 

 HIB Vaccines  Aged Out    No longer eligible



       based on patient's age



       to complete this topic

 

 Hepatitis A Vaccines  Aged Out    No longer eligible



       based on patient's age



       to complete this topic

 

 Hepatitis B Vaccines  Aged Out    No longer eligible



       based on patient's age



       to complete this topic

 

 IPV Vaccines  Aged Out    No longer eligible



       based on patient's age



       to complete this topic



documented as of this encounter



Implants







 Implanted  Type  Area    Device  Shelf  Model /



         Identifier  Expiration Date  Serial /



             Lot

 

 Sup[employer] Eviva [id number]. - [id number]    Right:  HOLOGIC    2018  [id number] /



 Implanted: Qty: 1 on 2018 by Bladimir Arita MD at [facility]    Breast         /



             [id number]



documented as of this encounter



Procedures







 Procedure Name  Priority  Date/Time  Associated Diagnosis  Comments

 

 CREATININE WITH GFR  Routine  2020  4:02  SLE (systemic lupus  Results 
for this



     PM EST  erythematosus related  procedure are in



       syndrome)



  the results



       High risk medication  section.



       use  

 

 SEDIMENTATION RATE,  Routine  2020  4:02  SLE (systemic lupus  Results 
for this



 AUTOMATED    PM EST  erythematosus related  procedure are in



       syndrome)



  the results



       High risk medication  section.



       use  

 

 CBC AND DIFFERENTIAL  Routine  2020  4:02  SLE (systemic lupus  Results 
for this



     PM EST  erythematosus related  procedure are in



       syndrome)



  the results



       High risk medication  section.



       use  

 

 C3 COMPLEMENT  Routine  2020  4:02  SLE (systemic lupus  Results for this



     PM EST  erythematosus related  procedure are in



       syndrome)



  the results



       High risk medication  section.



       use  

 

 C4 COMPLEMENT  Routine  2020  4:02  SLE (systemic lupus  Results for this



     PM EST  erythematosus related  procedure are in



       syndrome)



  the results



       High risk medication  section.



       use  

 

 C-REACTIVE PROTEIN  Routine  2020  4:02  SLE (systemic lupus  Results 
for this



     PM EST  erythematosus related  procedure are in



       syndrome)



  the results



       High risk medication  section.



       use  

 

 ALT  Routine  2020  4:02  SLE (systemic lupus  Results for this



     PM EST  erythematosus related  procedure are in



       syndrome)



  the results



       High risk medication  section.



       use  

 

 AST  Routine  2020  4:02  SLE (systemic lupus  Results for this



     PM EST  erythematosus related  procedure are in



       syndrome)



  the results



       High risk medication  section.



       use  



documented in this encounter



Results

C4 complement (2020  4:02 PM EST)





 Component  Value  Ref Range  Performed At  Pathologist Signature

 

 C4 - Complement  24  10 - 40 mg/dL  [facility]  



       Pathology  









 Specimen

 

 Plasma









 Performing Organization  Address  City[facility]  Phone Number

 

 [facility] CLINICAL PATHOLOGY  [address] [place] [address]  [phone]
[phone]

 

 [facility]  [address] [place] [address]  



 Pathology      



C3 complement (2020  4:02 PM EST)





 Component  Value  Ref Range  Performed At  Pathologist Signature

 

 C3 - Complement  106  90 - 180 mg/dL  [facility]  



       Pathology  









 Specimen

 

 Plasma









 Performing Organization  Address  City/State/[facility]  Phone Number

 

 [facility] PATHOLOGY  [address] [place] [address]  [phone]
[phone]

 

 [facility]  [address] [place] [address]  



 Pathology      



Sedimentation rate, automated (2020  4:02 PM EST)





 Component  Value  Ref Range  Performed At  Pathologist Signature

 

 Sed Rate - ESR  6  <30 mm/hr  [facility]  



       Pathology  









 Specimen

 

 EDTA Whole Blood









 Performing Organization  Address  City/State/[facility]  Phone Number

 

 [facility] CLINICAL PATHOLOGY  [address] [place] [address]  [phone]
[phone]

 

 [facility]  [address] [place] [address]  



 Pathology      



Creatinine with GFR (2020  4:02 PM EST)





 Component  Value  Ref Range  Performed At  Pathologist Signature

 

 Creatinine  0.97 (H)  0.50 - 0.90  [facility]  



     mg/dL  [facility]  

 

 GFR Non   66  >60  [facility]  



 American 2009 CDK-EPI    mL/min/1.73m2  [facility]  

 

 GFR African American  76  >60  [facility]  



 2009 CKD-EPI    mL/min/1.73m2  [facility]  









 Specimen

 

 Plasma









 Performing Organization  Address  City/State/[facility]  Phone Number

 

 [facility] PATHOLOGY  [address] [place] [address]  [phone]
[phone]

 

 [facility]  [address] [place] [address]  



 Pathology      



C-reactive protein (2020  4:02 PM EST)





 Component  Value  Ref Range  Performed At  Pathologist Signature

 

 C Reactive Protein  1.8  <8.0 mg/L  [facility]  



       Pathology  









 Specimen

 

 Plasma









 Performing Organization  Address  City/State[facility]  Phone Number

 

 [facility] CLINICAL PATHOLOGY  [address] [place] [address]  [phone]
[phone]

 

 [facility]  [address] [place] [address]  



 Pathology      



CBC and Differential (2020  4:02 PM EST)





 Component  Value  Ref Range  Performed At  Pathologist



         Signature

 

 White Blood Cell  7.3  4 - 10  [facility]  



     10*3/uL  [facility]  



       Pathology  

 

 Red Blood Cell  4.10  4.1 - 5.3  [facility]  



     10*6/uL  [facility]  



       Pathology  

 

 Hemoglobin  12.7  11.5 - 15.5  [facility]  



     g/dL  [facility]  



       Pathology  

 

 Hematocrit  38.1  36 - 45 %  [facility]  



       [facility]  



       Pathology  

 

 Mean Cell Volume  92.9  80 - 96 fL  [facility]  



       [facility]  



       Pathology  

 

 Mean Cell Hemoglobin  30.9  27 - 33 pg  [facility]  



       [facility]  



       Pathology  

 

 Mean Cell Hgb Conc  33.3  32.0 - 36.0  [facility]  



     g/dL  [facility]  



       Pathology  

 

 Red Cell Dist Width  14.1  11.5 - 14.5 %  [facility]  



       [facility]  



       Pathology  

 

 Platelet Count  381  150 - 400  [facility]  



     10*3/uL  [facility]  



       Pathology  

 

 Differential Type  Automated Diff    [facility]  



       [facility]  



       Pathology  

 

 Neutrophil  54  %  [facility]  



       [facility]  



       Pathology  

 

 Lymphocyte  35  %  [facility]  



       [facility]  



       Pathology  

 

 Monocyte  7  %  [facility]  



       [facility]  



       Pathology  

 

 Eosinophil  3  %  [facility]  



       [facility]  



       Pathology  

 

 Basophil  1  %  [facility]  



       [facility]  



       Pathology  

 

 Abs Neutrophil  4.02  1.8 - 7.0  [facility]  



     10*3/uL  [facility]  



       Pathology  

 

 Abs Lymphocyte  2.57  1.2 - 4.0  [facility]  



     10*3/uL  [facility]  



       Pathology  

 

 Abs Monocyte  0.52  0 - 0.8  [facility]  



     10*3/uL  [facility]  



       Pathology  

 

 Abs Eosinophil  0.19  0 - 0.5  [facility]  



     10*3/uL  [facility]  



       Pathology  

 

 Abs Basophil  0.04  0 - 0.2  [facility]  



     10*3/uL  [facility]  



       Pathology  

 

 Nucleated Red Blood  0  0 - 0  [facility]  



 Cells    /100{WBCs}  [facility]  



       Pathology  









 Specimen

 

 EDTA Whole Blood









 Performing Organization  Address  City[facility]  Phone Number

 

 [facility] CLINICAL PATHOLOGY  [address] [place] [address]  [phone]
[phone]

 

 [facility]  [address] [place] [address]  



 Pathology      



AST (2020  4:02 PM EST)





 Component  Value  Ref Range  Performed At  Pathologist Signature

 

 AST/SGO  19  <32 U/L  [facility] Pathology  









 Specimen

 

 Plasma









 Performing Organization  Address  [facility]  Phone Number

 

 [facility] CLINICAL PATHOLOGY  [address] [place] [address]  [phone]
[phone]

 

 [facility]  [address] [place] [address]  



 Pathology      



ALT (2020  4:02 PM EST)





 Component  Value  Ref Range  Performed At  Pathologist Signature

 

 ALT/SGP  13  <33 U/L  [facility] Pathology  









 Specimen

 

 Plasma









 Performing Organization  Address  City[facility]  Phone Number

 

 [facility] CLINICAL PATHOLOGY  [address] [place] [address]  [phone]
[phone]

 

 [facility]  [address] [place] [address]  



 Pathology      



documented in this encounter



Visit Diagnoses







 Diagnosis

 

 SLE (systemic lupus erythematosus related syndrome) - Primary







 Systemic lupus erythematosus

 

 Long-term use of Plaquenil

 

 High risk medication use







 Encounter for long-term (current) use of other medications

 

 Anticoagulated







 Encounter for long-term (current) use of anticoagulants



documented in this encounter